# Patient Record
Sex: MALE | Race: WHITE | NOT HISPANIC OR LATINO | Employment: OTHER | URBAN - METROPOLITAN AREA
[De-identification: names, ages, dates, MRNs, and addresses within clinical notes are randomized per-mention and may not be internally consistent; named-entity substitution may affect disease eponyms.]

---

## 2017-03-13 ENCOUNTER — OFFICE VISIT - HEALTHEAST (OUTPATIENT)
Dept: FAMILY MEDICINE | Facility: CLINIC | Age: 72
End: 2017-03-13

## 2017-03-13 DIAGNOSIS — I63.439: ICD-10-CM

## 2017-03-13 DIAGNOSIS — E78.00 HYPERCHOLESTEREMIA: ICD-10-CM

## 2017-03-13 DIAGNOSIS — E78.00 PURE HYPERCHOLESTEROLEMIA: ICD-10-CM

## 2017-03-13 DIAGNOSIS — F48.2 PSEUDOBULBAR AFFECT: ICD-10-CM

## 2017-03-13 DIAGNOSIS — R47.1 DYSARTHRIA: ICD-10-CM

## 2017-03-13 LAB
CHOLEST SERPL-MCNC: 113 MG/DL
FASTING STATUS PATIENT QL REPORTED: YES
HDLC SERPL-MCNC: 32 MG/DL
LDLC SERPL CALC-MCNC: 64 MG/DL
TRIGL SERPL-MCNC: 85 MG/DL

## 2017-03-13 ASSESSMENT — MIFFLIN-ST. JEOR: SCORE: 1708.08

## 2017-03-14 ENCOUNTER — COMMUNICATION - HEALTHEAST (OUTPATIENT)
Dept: FAMILY MEDICINE | Facility: CLINIC | Age: 72
End: 2017-03-14

## 2017-09-27 ENCOUNTER — HOSPITAL ENCOUNTER (OUTPATIENT)
Dept: LAB | Age: 72
Setting detail: SPECIMEN
Discharge: HOME | End: 2017-09-27

## 2017-09-27 ENCOUNTER — OFFICE VISIT - HEALTHEAST (OUTPATIENT)
Dept: FAMILY MEDICINE | Facility: CLINIC | Age: 72
End: 2017-09-27

## 2017-09-27 DIAGNOSIS — E78.00 PURE HYPERCHOLESTEROLEMIA: ICD-10-CM

## 2017-09-27 DIAGNOSIS — R47.1 DYSARTHRIA: ICD-10-CM

## 2017-09-27 DIAGNOSIS — I63.9 CEREBRAL INFARCT (H): ICD-10-CM

## 2017-09-27 LAB
CHOLEST SERPL-MCNC: 107 MG/DL
FASTING STATUS PATIENT QL REPORTED: YES
HDLC SERPL-MCNC: 29 MG/DL
LDLC SERPL CALC-MCNC: 56 MG/DL
TRIGL SERPL-MCNC: 108 MG/DL

## 2017-09-27 ASSESSMENT — MIFFLIN-ST. JEOR: SCORE: 1760.7

## 2017-09-28 ENCOUNTER — COMMUNICATION - HEALTHEAST (OUTPATIENT)
Dept: FAMILY MEDICINE | Facility: CLINIC | Age: 72
End: 2017-09-28

## 2017-10-12 ENCOUNTER — COMMUNICATION - HEALTHEAST (OUTPATIENT)
Dept: FAMILY MEDICINE | Facility: CLINIC | Age: 72
End: 2017-10-12

## 2017-10-12 DIAGNOSIS — I63.439: ICD-10-CM

## 2017-10-12 DIAGNOSIS — F48.2 PSEUDOBULBAR AFFECT: ICD-10-CM

## 2017-10-12 DIAGNOSIS — E78.00 HYPERCHOLESTEREMIA: ICD-10-CM

## 2018-03-25 ENCOUNTER — RECORDS - HEALTHEAST (OUTPATIENT)
Dept: ADMINISTRATIVE | Facility: OTHER | Age: 73
End: 2018-03-25

## 2018-03-29 ENCOUNTER — HOSPITAL ENCOUNTER (OUTPATIENT)
Dept: LAB | Age: 73
Setting detail: SPECIMEN
Discharge: HOME | End: 2018-03-29

## 2018-03-29 ENCOUNTER — OFFICE VISIT - HEALTHEAST (OUTPATIENT)
Dept: FAMILY MEDICINE | Facility: CLINIC | Age: 73
End: 2018-03-29

## 2018-03-29 DIAGNOSIS — G47.33 OBSTRUCTIVE SLEEP APNEA: ICD-10-CM

## 2018-03-29 DIAGNOSIS — F48.2 PSEUDOBULBAR AFFECT: ICD-10-CM

## 2018-03-29 DIAGNOSIS — E78.00 PURE HYPERCHOLESTEROLEMIA: ICD-10-CM

## 2018-03-29 DIAGNOSIS — I63.439: ICD-10-CM

## 2018-03-29 LAB
ALBUMIN SERPL-MCNC: 3.8 G/DL (ref 3.5–5)
ALP SERPL-CCNC: 86 U/L (ref 45–120)
ALT SERPL W P-5'-P-CCNC: 54 U/L (ref 0–45)
ANION GAP SERPL CALCULATED.3IONS-SCNC: 9 MMOL/L (ref 5–18)
AST SERPL W P-5'-P-CCNC: 29 U/L (ref 0–40)
BILIRUB DIRECT SERPL-MCNC: 0.2 MG/DL
BILIRUB SERPL-MCNC: 0.7 MG/DL (ref 0–1)
BUN SERPL-MCNC: 14 MG/DL (ref 8–28)
CALCIUM SERPL-MCNC: 8.7 MG/DL (ref 8.5–10.5)
CHLORIDE BLD-SCNC: 105 MMOL/L (ref 98–107)
CHOLEST SERPL-MCNC: 105 MG/DL
CO2 SERPL-SCNC: 25 MMOL/L (ref 22–31)
CREAT SERPL-MCNC: 1.05 MG/DL (ref 0.7–1.3)
FASTING STATUS PATIENT QL REPORTED: YES
GFR SERPL CREATININE-BSD FRML MDRD: >60 ML/MIN/1.73M2
GLUCOSE BLD-MCNC: 127 MG/DL (ref 70–125)
HDLC SERPL-MCNC: 29 MG/DL
LDLC SERPL CALC-MCNC: 48 MG/DL
POTASSIUM BLD-SCNC: 4.9 MMOL/L (ref 3.5–5)
PROT SERPL-MCNC: 6.8 G/DL (ref 6–8)
SODIUM SERPL-SCNC: 139 MMOL/L (ref 136–145)
TRIGL SERPL-MCNC: 138 MG/DL

## 2018-03-30 ENCOUNTER — AMBULATORY - HEALTHEAST (OUTPATIENT)
Dept: FAMILY MEDICINE | Facility: CLINIC | Age: 73
End: 2018-03-30

## 2018-03-30 ENCOUNTER — COMMUNICATION - HEALTHEAST (OUTPATIENT)
Dept: FAMILY MEDICINE | Facility: CLINIC | Age: 73
End: 2018-03-30

## 2018-03-30 DIAGNOSIS — R73.9 HYPERGLYCEMIA: ICD-10-CM

## 2018-04-05 ENCOUNTER — COMMUNICATION - HEALTHEAST (OUTPATIENT)
Dept: FAMILY MEDICINE | Facility: CLINIC | Age: 73
End: 2018-04-05

## 2018-04-05 ENCOUNTER — AMBULATORY - HEALTHEAST (OUTPATIENT)
Dept: LAB | Facility: CLINIC | Age: 73
End: 2018-04-05

## 2018-04-05 DIAGNOSIS — I63.439: ICD-10-CM

## 2018-04-05 DIAGNOSIS — R73.9 HYPERGLYCEMIA: ICD-10-CM

## 2018-04-05 LAB
FASTING STATUS PATIENT QL REPORTED: YES
GLUCOSE BLD-MCNC: 122 MG/DL (ref 79–116)
HBA1C MFR BLD: 6.8 % (ref 3.5–6)

## 2018-04-09 ENCOUNTER — OFFICE VISIT - HEALTHEAST (OUTPATIENT)
Dept: FAMILY MEDICINE | Facility: CLINIC | Age: 73
End: 2018-04-09

## 2018-04-09 DIAGNOSIS — E11.9 NON-INSULIN DEPENDENT TYPE 2 DIABETES MELLITUS (H): ICD-10-CM

## 2018-04-09 LAB
CREAT UR-MCNC: 33.4 MG/DL
MICROALBUMIN UR-MCNC: <0.5 MG/DL (ref 0–1.99)
MICROALBUMIN/CREAT UR: NORMAL MG/G

## 2018-04-10 ENCOUNTER — COMMUNICATION - HEALTHEAST (OUTPATIENT)
Dept: FAMILY MEDICINE | Facility: CLINIC | Age: 73
End: 2018-04-10

## 2018-04-19 ENCOUNTER — COMMUNICATION - HEALTHEAST (OUTPATIENT)
Dept: FAMILY MEDICINE | Facility: CLINIC | Age: 73
End: 2018-04-19

## 2018-05-01 ENCOUNTER — RECORDS - HEALTHEAST (OUTPATIENT)
Dept: ADMINISTRATIVE | Facility: OTHER | Age: 73
End: 2018-05-01

## 2018-05-02 ENCOUNTER — RECORDS - HEALTHEAST (OUTPATIENT)
Dept: ADMINISTRATIVE | Facility: OTHER | Age: 73
End: 2018-05-02

## 2018-05-08 ENCOUNTER — AMBULATORY - HEALTHEAST (OUTPATIENT)
Dept: EDUCATION SERVICES | Facility: CLINIC | Age: 73
End: 2018-05-08

## 2018-05-08 DIAGNOSIS — E11.9 TYPE 2 DIABETES MELLITUS WITHOUT COMPLICATION, WITHOUT LONG-TERM CURRENT USE OF INSULIN (H): ICD-10-CM

## 2018-05-22 ENCOUNTER — OFFICE VISIT - HEALTHEAST (OUTPATIENT)
Dept: EDUCATION SERVICES | Facility: CLINIC | Age: 73
End: 2018-05-22

## 2018-05-22 DIAGNOSIS — E11.9 TYPE 2 DIABETES MELLITUS WITHOUT COMPLICATION, WITHOUT LONG-TERM CURRENT USE OF INSULIN (H): ICD-10-CM

## 2018-07-15 ENCOUNTER — COMMUNICATION - HEALTHEAST (OUTPATIENT)
Dept: FAMILY MEDICINE | Facility: CLINIC | Age: 73
End: 2018-07-15

## 2018-07-15 DIAGNOSIS — I63.439: ICD-10-CM

## 2018-07-15 DIAGNOSIS — F48.2 PSEUDOBULBAR AFFECT: ICD-10-CM

## 2018-07-15 DIAGNOSIS — E78.00 HYPERCHOLESTEREMIA: ICD-10-CM

## 2018-07-23 ENCOUNTER — OFFICE VISIT - HEALTHEAST (OUTPATIENT)
Dept: FAMILY MEDICINE | Facility: CLINIC | Age: 73
End: 2018-07-23

## 2018-07-23 DIAGNOSIS — F48.2 PSEUDOBULBAR AFFECT: ICD-10-CM

## 2018-07-23 DIAGNOSIS — E78.00 PURE HYPERCHOLESTEROLEMIA: ICD-10-CM

## 2018-07-23 DIAGNOSIS — E78.00 HYPERCHOLESTEREMIA: ICD-10-CM

## 2018-07-23 DIAGNOSIS — I63.439: ICD-10-CM

## 2018-07-23 DIAGNOSIS — E11.9 NON-INSULIN DEPENDENT TYPE 2 DIABETES MELLITUS (H): ICD-10-CM

## 2018-07-23 LAB
ALBUMIN SERPL-MCNC: 4.1 G/DL (ref 3.5–5)
ALP SERPL-CCNC: 78 U/L (ref 45–120)
ALT SERPL W P-5'-P-CCNC: 36 U/L (ref 0–45)
ANION GAP SERPL CALCULATED.3IONS-SCNC: 9 MMOL/L (ref 5–18)
AST SERPL W P-5'-P-CCNC: 26 U/L (ref 0–40)
BILIRUB DIRECT SERPL-MCNC: 0.2 MG/DL
BILIRUB SERPL-MCNC: 0.5 MG/DL (ref 0–1)
BUN SERPL-MCNC: 19 MG/DL (ref 8–28)
CALCIUM SERPL-MCNC: 8.8 MG/DL (ref 8.5–10.5)
CHLORIDE BLD-SCNC: 110 MMOL/L (ref 98–107)
CHOLEST SERPL-MCNC: 96 MG/DL
CO2 SERPL-SCNC: 24 MMOL/L (ref 22–31)
CREAT SERPL-MCNC: 1.01 MG/DL (ref 0.7–1.3)
FASTING STATUS PATIENT QL REPORTED: YES
GFR SERPL CREATININE-BSD FRML MDRD: >60 ML/MIN/1.73M2
GLUCOSE BLD-MCNC: 116 MG/DL (ref 70–125)
HBA1C MFR BLD: 6.2 % (ref 3.5–6)
HDLC SERPL-MCNC: 29 MG/DL
LDLC SERPL CALC-MCNC: 48 MG/DL
POTASSIUM BLD-SCNC: 4.5 MMOL/L (ref 3.5–5)
PROT SERPL-MCNC: 6.8 G/DL (ref 6–8)
SODIUM SERPL-SCNC: 143 MMOL/L (ref 136–145)
TRIGL SERPL-MCNC: 95 MG/DL

## 2018-08-28 ENCOUNTER — OFFICE VISIT - HEALTHEAST (OUTPATIENT)
Dept: EDUCATION SERVICES | Facility: CLINIC | Age: 73
End: 2018-08-28

## 2018-08-28 DIAGNOSIS — E11.9 TYPE 2 DIABETES MELLITUS WITHOUT COMPLICATION, WITHOUT LONG-TERM CURRENT USE OF INSULIN (H): ICD-10-CM

## 2018-09-11 ENCOUNTER — COMMUNICATION - HEALTHEAST (OUTPATIENT)
Dept: FAMILY MEDICINE | Facility: CLINIC | Age: 73
End: 2018-09-11

## 2018-09-12 ENCOUNTER — RECORDS - HEALTHEAST (OUTPATIENT)
Dept: ADMINISTRATIVE | Facility: OTHER | Age: 73
End: 2018-09-12

## 2018-10-28 ENCOUNTER — COMMUNICATION - HEALTHEAST (OUTPATIENT)
Dept: FAMILY MEDICINE | Facility: CLINIC | Age: 73
End: 2018-10-28

## 2018-10-28 DIAGNOSIS — E11.9 TYPE 2 DIABETES MELLITUS WITHOUT COMPLICATION, WITHOUT LONG-TERM CURRENT USE OF INSULIN (H): ICD-10-CM

## 2018-11-28 ENCOUNTER — RECORDS - HEALTHEAST (OUTPATIENT)
Dept: ADMINISTRATIVE | Facility: OTHER | Age: 73
End: 2018-11-28

## 2018-11-28 LAB — RETINOPATHY: NEGATIVE

## 2018-11-29 ENCOUNTER — OFFICE VISIT - HEALTHEAST (OUTPATIENT)
Dept: FAMILY MEDICINE | Facility: CLINIC | Age: 73
End: 2018-11-29

## 2018-11-29 DIAGNOSIS — E78.00 HYPERCHOLESTEREMIA: ICD-10-CM

## 2018-11-29 DIAGNOSIS — I63.439: ICD-10-CM

## 2018-11-29 DIAGNOSIS — E11.9 TYPE 2 DIABETES MELLITUS WITHOUT COMPLICATION, WITHOUT LONG-TERM CURRENT USE OF INSULIN (H): ICD-10-CM

## 2018-11-29 DIAGNOSIS — R47.1 DYSARTHRIA: ICD-10-CM

## 2018-11-29 DIAGNOSIS — E11.9 NON-INSULIN DEPENDENT TYPE 2 DIABETES MELLITUS (H): ICD-10-CM

## 2018-11-29 DIAGNOSIS — F48.2 PSEUDOBULBAR AFFECT: ICD-10-CM

## 2018-11-29 DIAGNOSIS — E78.00 PURE HYPERCHOLESTEROLEMIA: ICD-10-CM

## 2018-11-29 DIAGNOSIS — I63.431 CEREBRAL INFARCTION DUE TO EMBOLISM OF RIGHT POSTERIOR CEREBRAL ARTERY (H): ICD-10-CM

## 2018-11-29 LAB
ALBUMIN SERPL-MCNC: 4.1 G/DL (ref 3.5–5)
ALP SERPL-CCNC: 80 U/L (ref 45–120)
ALT SERPL W P-5'-P-CCNC: 38 U/L (ref 0–45)
ANION GAP SERPL CALCULATED.3IONS-SCNC: 9 MMOL/L (ref 5–18)
AST SERPL W P-5'-P-CCNC: 28 U/L (ref 0–40)
BILIRUB DIRECT SERPL-MCNC: 0.3 MG/DL
BILIRUB SERPL-MCNC: 0.9 MG/DL (ref 0–1)
BUN SERPL-MCNC: 15 MG/DL (ref 8–28)
CALCIUM SERPL-MCNC: 9.5 MG/DL (ref 8.5–10.5)
CHLORIDE BLD-SCNC: 108 MMOL/L (ref 98–107)
CHOLEST SERPL-MCNC: 100 MG/DL
CO2 SERPL-SCNC: 26 MMOL/L (ref 22–31)
CREAT SERPL-MCNC: 1.11 MG/DL (ref 0.7–1.3)
FASTING STATUS PATIENT QL REPORTED: YES
GFR SERPL CREATININE-BSD FRML MDRD: >60 ML/MIN/1.73M2
GLUCOSE BLD-MCNC: 116 MG/DL (ref 70–125)
HDLC SERPL-MCNC: 34 MG/DL
LDLC SERPL CALC-MCNC: 46 MG/DL
POTASSIUM BLD-SCNC: 4.6 MMOL/L (ref 3.5–5)
PROT SERPL-MCNC: 7.1 G/DL (ref 6–8)
SODIUM SERPL-SCNC: 143 MMOL/L (ref 136–145)
TRIGL SERPL-MCNC: 102 MG/DL

## 2018-11-30 ENCOUNTER — COMMUNICATION - HEALTHEAST (OUTPATIENT)
Dept: FAMILY MEDICINE | Facility: CLINIC | Age: 73
End: 2018-11-30

## 2018-11-30 LAB — HBA1C MFR BLD: 6.5 % (ref 4.2–6.1)

## 2019-03-07 ENCOUNTER — RECORDS - HEALTHEAST (OUTPATIENT)
Dept: ADMINISTRATIVE | Facility: OTHER | Age: 74
End: 2019-03-07

## 2019-03-07 ENCOUNTER — COMMUNICATION - HEALTHEAST (OUTPATIENT)
Dept: FAMILY MEDICINE | Facility: CLINIC | Age: 74
End: 2019-03-07

## 2019-04-04 ENCOUNTER — OFFICE VISIT - HEALTHEAST (OUTPATIENT)
Dept: FAMILY MEDICINE | Facility: CLINIC | Age: 74
End: 2019-04-04

## 2019-04-04 DIAGNOSIS — I63.431 CEREBRAL INFARCTION DUE TO EMBOLISM OF RIGHT POSTERIOR CEREBRAL ARTERY (H): ICD-10-CM

## 2019-04-04 DIAGNOSIS — E78.00 HYPERCHOLESTEREMIA: ICD-10-CM

## 2019-04-04 DIAGNOSIS — I63.439: ICD-10-CM

## 2019-04-04 DIAGNOSIS — E11.9 NON-INSULIN DEPENDENT TYPE 2 DIABETES MELLITUS (H): ICD-10-CM

## 2019-04-04 DIAGNOSIS — E11.9 TYPE 2 DIABETES MELLITUS WITHOUT COMPLICATION, WITHOUT LONG-TERM CURRENT USE OF INSULIN (H): ICD-10-CM

## 2019-04-04 DIAGNOSIS — F48.2 PSEUDOBULBAR AFFECT: ICD-10-CM

## 2019-04-04 DIAGNOSIS — E78.00 PURE HYPERCHOLESTEROLEMIA: ICD-10-CM

## 2019-04-04 DIAGNOSIS — R47.1 DYSARTHRIA: ICD-10-CM

## 2019-04-04 LAB
ALBUMIN SERPL-MCNC: 4.2 G/DL (ref 3.5–5)
ALP SERPL-CCNC: 71 U/L (ref 45–120)
ALT SERPL W P-5'-P-CCNC: 34 U/L (ref 0–45)
ANION GAP SERPL CALCULATED.3IONS-SCNC: 13 MMOL/L (ref 5–18)
AST SERPL W P-5'-P-CCNC: 26 U/L (ref 0–40)
BILIRUB DIRECT SERPL-MCNC: 0.3 MG/DL
BILIRUB SERPL-MCNC: 0.7 MG/DL (ref 0–1)
BUN SERPL-MCNC: 15 MG/DL (ref 8–28)
CALCIUM SERPL-MCNC: 9.8 MG/DL (ref 8.5–10.5)
CHLORIDE BLD-SCNC: 110 MMOL/L (ref 98–107)
CHOLEST SERPL-MCNC: 104 MG/DL
CO2 SERPL-SCNC: 23 MMOL/L (ref 22–31)
CREAT SERPL-MCNC: 1.17 MG/DL (ref 0.7–1.3)
FASTING STATUS PATIENT QL REPORTED: YES
GFR SERPL CREATININE-BSD FRML MDRD: >60 ML/MIN/1.73M2
GLUCOSE BLD-MCNC: 122 MG/DL (ref 70–125)
HBA1C MFR BLD: 6.5 % (ref 3.5–6)
HDLC SERPL-MCNC: 36 MG/DL
LDLC SERPL CALC-MCNC: 47 MG/DL
POTASSIUM BLD-SCNC: 4.8 MMOL/L (ref 3.5–5)
PROT SERPL-MCNC: 6.9 G/DL (ref 6–8)
SODIUM SERPL-SCNC: 146 MMOL/L (ref 136–145)
TRIGL SERPL-MCNC: 107 MG/DL

## 2019-04-04 ASSESSMENT — MIFFLIN-ST. JEOR: SCORE: 1748.9

## 2019-04-05 ENCOUNTER — COMMUNICATION - HEALTHEAST (OUTPATIENT)
Dept: FAMILY MEDICINE | Facility: CLINIC | Age: 74
End: 2019-04-05

## 2019-08-14 ENCOUNTER — RECORDS - HEALTHEAST (OUTPATIENT)
Dept: ADMINISTRATIVE | Facility: OTHER | Age: 74
End: 2019-08-14

## 2019-08-14 ENCOUNTER — COMMUNICATION - HEALTHEAST (OUTPATIENT)
Dept: FAMILY MEDICINE | Facility: CLINIC | Age: 74
End: 2019-08-14

## 2019-08-15 ENCOUNTER — OFFICE VISIT - HEALTHEAST (OUTPATIENT)
Dept: FAMILY MEDICINE | Facility: CLINIC | Age: 74
End: 2019-08-15

## 2019-08-15 DIAGNOSIS — R47.1 DYSARTHRIA: ICD-10-CM

## 2019-08-15 DIAGNOSIS — I63.431 CEREBRAL INFARCTION DUE TO EMBOLISM OF RIGHT POSTERIOR CEREBRAL ARTERY (H): ICD-10-CM

## 2019-08-15 DIAGNOSIS — E78.00 PURE HYPERCHOLESTEROLEMIA: ICD-10-CM

## 2019-08-15 DIAGNOSIS — E11.9 NON-INSULIN DEPENDENT TYPE 2 DIABETES MELLITUS (H): ICD-10-CM

## 2019-08-15 LAB
ALBUMIN SERPL-MCNC: 4.1 G/DL (ref 3.5–5)
ALP SERPL-CCNC: 82 U/L (ref 45–120)
ALT SERPL W P-5'-P-CCNC: 29 U/L (ref 0–45)
ANION GAP SERPL CALCULATED.3IONS-SCNC: 8 MMOL/L (ref 5–18)
AST SERPL W P-5'-P-CCNC: 24 U/L (ref 0–40)
BILIRUB DIRECT SERPL-MCNC: 0.3 MG/DL
BILIRUB SERPL-MCNC: 0.7 MG/DL (ref 0–1)
BUN SERPL-MCNC: 16 MG/DL (ref 8–28)
CALCIUM SERPL-MCNC: 9.4 MG/DL (ref 8.5–10.5)
CHLORIDE BLD-SCNC: 107 MMOL/L (ref 98–107)
CHOLEST SERPL-MCNC: 110 MG/DL
CO2 SERPL-SCNC: 26 MMOL/L (ref 22–31)
CREAT SERPL-MCNC: 1.06 MG/DL (ref 0.7–1.3)
CREAT UR-MCNC: 283.6 MG/DL
FASTING STATUS PATIENT QL REPORTED: ABNORMAL
GFR SERPL CREATININE-BSD FRML MDRD: >60 ML/MIN/1.73M2
GLUCOSE BLD-MCNC: 111 MG/DL (ref 70–125)
HDLC SERPL-MCNC: 30 MG/DL
LDLC SERPL CALC-MCNC: 55 MG/DL
MICROALBUMIN UR-MCNC: 1.24 MG/DL (ref 0–1.99)
MICROALBUMIN/CREAT UR: 4.4 MG/G
POTASSIUM BLD-SCNC: 4.6 MMOL/L (ref 3.5–5)
PROT SERPL-MCNC: 6.9 G/DL (ref 6–8)
SODIUM SERPL-SCNC: 141 MMOL/L (ref 136–145)
TRIGL SERPL-MCNC: 126 MG/DL

## 2019-08-15 ASSESSMENT — MIFFLIN-ST. JEOR: SCORE: 1686.54

## 2019-08-16 ENCOUNTER — COMMUNICATION - HEALTHEAST (OUTPATIENT)
Dept: FAMILY MEDICINE | Facility: CLINIC | Age: 74
End: 2019-08-16

## 2019-09-23 ENCOUNTER — COMMUNICATION - HEALTHEAST (OUTPATIENT)
Dept: TELEHEALTH | Facility: CLINIC | Age: 74
End: 2019-09-23

## 2019-09-23 ENCOUNTER — OFFICE VISIT - HEALTHEAST (OUTPATIENT)
Dept: FAMILY MEDICINE | Facility: CLINIC | Age: 74
End: 2019-09-23

## 2019-09-23 DIAGNOSIS — R10.9 RIGHT FLANK PAIN: ICD-10-CM

## 2019-09-23 LAB
ALBUMIN SERPL-MCNC: 4 G/DL (ref 3.5–5)
ALBUMIN UR-MCNC: NEGATIVE MG/DL
ALP SERPL-CCNC: 71 U/L (ref 45–120)
ALT SERPL W P-5'-P-CCNC: 33 U/L (ref 0–45)
ANION GAP SERPL CALCULATED.3IONS-SCNC: 9 MMOL/L (ref 5–18)
APPEARANCE UR: CLEAR
AST SERPL W P-5'-P-CCNC: 23 U/L (ref 0–40)
BILIRUB DIRECT SERPL-MCNC: 0.2 MG/DL
BILIRUB SERPL-MCNC: 0.5 MG/DL (ref 0–1)
BILIRUB UR QL STRIP: NEGATIVE
BUN SERPL-MCNC: 14 MG/DL (ref 8–28)
CALCIUM SERPL-MCNC: 9.4 MG/DL (ref 8.5–10.5)
CHLORIDE BLD-SCNC: 107 MMOL/L (ref 98–107)
CO2 SERPL-SCNC: 26 MMOL/L (ref 22–31)
COLOR UR AUTO: YELLOW
CREAT SERPL-MCNC: 0.91 MG/DL (ref 0.7–1.3)
ERYTHROCYTE [DISTWIDTH] IN BLOOD BY AUTOMATED COUNT: 12.2 % (ref 11–14.5)
GFR SERPL CREATININE-BSD FRML MDRD: >60 ML/MIN/1.73M2
GLUCOSE BLD-MCNC: 102 MG/DL (ref 70–125)
GLUCOSE UR STRIP-MCNC: NEGATIVE MG/DL
HCT VFR BLD AUTO: 40.5 % (ref 40–54)
HGB BLD-MCNC: 13.6 G/DL (ref 14–18)
HGB UR QL STRIP: NEGATIVE
KETONES UR STRIP-MCNC: NEGATIVE MG/DL
LEUKOCYTE ESTERASE UR QL STRIP: NEGATIVE
MCH RBC QN AUTO: 28.7 PG (ref 27–34)
MCHC RBC AUTO-ENTMCNC: 33.7 G/DL (ref 32–36)
MCV RBC AUTO: 85 FL (ref 80–100)
NITRATE UR QL: NEGATIVE
PH UR STRIP: 7 [PH] (ref 5–8)
PLATELET # BLD AUTO: 215 THOU/UL (ref 140–440)
PMV BLD AUTO: 7.6 FL (ref 7–10)
POTASSIUM BLD-SCNC: 4.7 MMOL/L (ref 3.5–5)
PROT SERPL-MCNC: 6.9 G/DL (ref 6–8)
RBC # BLD AUTO: 4.74 MILL/UL (ref 4.4–6.2)
SODIUM SERPL-SCNC: 142 MMOL/L (ref 136–145)
SP GR UR STRIP: 1.01 (ref 1–1.03)
UROBILINOGEN UR STRIP-ACNC: NORMAL
WBC: 6 THOU/UL (ref 4–11)

## 2019-10-03 ENCOUNTER — OFFICE VISIT - HEALTHEAST (OUTPATIENT)
Dept: FAMILY MEDICINE | Facility: CLINIC | Age: 74
End: 2019-10-03

## 2019-10-03 DIAGNOSIS — R47.1 DYSARTHRIA: ICD-10-CM

## 2019-10-03 DIAGNOSIS — E11.9 NON-INSULIN DEPENDENT TYPE 2 DIABETES MELLITUS (H): ICD-10-CM

## 2019-10-03 DIAGNOSIS — E78.00 HYPERCHOLESTEREMIA: ICD-10-CM

## 2019-10-03 DIAGNOSIS — E78.00 PURE HYPERCHOLESTEROLEMIA: ICD-10-CM

## 2019-10-03 DIAGNOSIS — G47.33 OBSTRUCTIVE SLEEP APNEA: ICD-10-CM

## 2019-10-03 DIAGNOSIS — Z00.00 MEDICARE ANNUAL WELLNESS VISIT, SUBSEQUENT: ICD-10-CM

## 2019-10-03 DIAGNOSIS — I63.431 CEREBRAL INFARCTION DUE TO EMBOLISM OF RIGHT POSTERIOR CEREBRAL ARTERY (H): ICD-10-CM

## 2019-10-03 DIAGNOSIS — I63.439: ICD-10-CM

## 2019-10-03 DIAGNOSIS — F48.2 PSEUDOBULBAR AFFECT: ICD-10-CM

## 2019-10-03 LAB — HBA1C MFR BLD: 6.1 % (ref 3.5–6)

## 2019-10-03 ASSESSMENT — MIFFLIN-ST. JEOR: SCORE: 1677.47

## 2019-10-17 ENCOUNTER — COMMUNICATION - HEALTHEAST (OUTPATIENT)
Dept: FAMILY MEDICINE | Facility: CLINIC | Age: 74
End: 2019-10-17

## 2019-10-22 ENCOUNTER — AMBULATORY - HEALTHEAST (OUTPATIENT)
Dept: OTHER | Facility: CLINIC | Age: 74
End: 2019-10-22

## 2019-10-22 ENCOUNTER — DOCUMENTATION ONLY (OUTPATIENT)
Dept: OTHER | Facility: CLINIC | Age: 74
End: 2019-10-22

## 2019-11-14 ENCOUNTER — RECORDS - HEALTHEAST (OUTPATIENT)
Dept: HEALTH INFORMATION MANAGEMENT | Facility: CLINIC | Age: 74
End: 2019-11-14

## 2020-02-17 ENCOUNTER — RECORDS - HEALTHEAST (OUTPATIENT)
Dept: ADMINISTRATIVE | Facility: OTHER | Age: 75
End: 2020-02-17

## 2020-02-17 ENCOUNTER — COMMUNICATION - HEALTHEAST (OUTPATIENT)
Dept: FAMILY MEDICINE | Facility: CLINIC | Age: 75
End: 2020-02-17

## 2020-04-29 ENCOUNTER — OFFICE VISIT - HEALTHEAST (OUTPATIENT)
Dept: FAMILY MEDICINE | Facility: CLINIC | Age: 75
End: 2020-04-29

## 2020-04-29 DIAGNOSIS — I63.431 CEREBRAL INFARCTION DUE TO EMBOLISM OF RIGHT POSTERIOR CEREBRAL ARTERY (H): ICD-10-CM

## 2020-04-29 DIAGNOSIS — F48.2 PSEUDOBULBAR AFFECT: ICD-10-CM

## 2020-04-29 DIAGNOSIS — R47.1 DYSARTHRIA: ICD-10-CM

## 2020-04-29 DIAGNOSIS — E11.9 NON-INSULIN DEPENDENT TYPE 2 DIABETES MELLITUS (H): ICD-10-CM

## 2020-04-29 DIAGNOSIS — E78.00 PURE HYPERCHOLESTEROLEMIA: ICD-10-CM

## 2020-05-11 ENCOUNTER — COMMUNICATION - HEALTHEAST (OUTPATIENT)
Dept: FAMILY MEDICINE | Facility: CLINIC | Age: 75
End: 2020-05-11

## 2020-05-11 DIAGNOSIS — I63.439: ICD-10-CM

## 2020-05-11 DIAGNOSIS — E78.00 HYPERCHOLESTEREMIA: ICD-10-CM

## 2020-05-21 ASSESSMENT — MIFFLIN-ST. JEOR: SCORE: 1668.4

## 2020-05-22 ENCOUNTER — OFFICE VISIT - HEALTHEAST (OUTPATIENT)
Dept: SLEEP MEDICINE | Facility: CLINIC | Age: 75
End: 2020-05-22

## 2020-05-22 DIAGNOSIS — I63.431 CEREBRAL INFARCTION DUE TO EMBOLISM OF RIGHT POSTERIOR CEREBRAL ARTERY (H): ICD-10-CM

## 2020-05-22 DIAGNOSIS — E11.9 NON-INSULIN DEPENDENT TYPE 2 DIABETES MELLITUS (H): ICD-10-CM

## 2020-05-22 DIAGNOSIS — G47.33 OSA (OBSTRUCTIVE SLEEP APNEA): ICD-10-CM

## 2020-06-30 ENCOUNTER — COMMUNICATION - HEALTHEAST (OUTPATIENT)
Dept: OTHER | Facility: CLINIC | Age: 75
End: 2020-06-30

## 2020-07-21 ENCOUNTER — OFFICE VISIT - HEALTHEAST (OUTPATIENT)
Dept: FAMILY MEDICINE | Facility: CLINIC | Age: 75
End: 2020-07-21

## 2020-07-21 DIAGNOSIS — E11.9 NON-INSULIN DEPENDENT TYPE 2 DIABETES MELLITUS (H): ICD-10-CM

## 2020-07-21 DIAGNOSIS — I63.431 CEREBRAL INFARCTION DUE TO EMBOLISM OF RIGHT POSTERIOR CEREBRAL ARTERY (H): ICD-10-CM

## 2020-07-21 LAB
ANION GAP SERPL CALCULATED.3IONS-SCNC: 10 MMOL/L (ref 5–18)
BUN SERPL-MCNC: 13 MG/DL (ref 8–28)
CALCIUM SERPL-MCNC: 9.6 MG/DL (ref 8.5–10.5)
CHLORIDE BLD-SCNC: 106 MMOL/L (ref 98–107)
CO2 SERPL-SCNC: 25 MMOL/L (ref 22–31)
CREAT SERPL-MCNC: 1.1 MG/DL (ref 0.7–1.3)
ERYTHROCYTE [DISTWIDTH] IN BLOOD BY AUTOMATED COUNT: 12.7 % (ref 11–14.5)
GFR SERPL CREATININE-BSD FRML MDRD: >60 ML/MIN/1.73M2
GLUCOSE BLD-MCNC: 108 MG/DL (ref 70–125)
HBA1C MFR BLD: 6 % (ref 3.5–6)
HCT VFR BLD AUTO: 41.7 % (ref 40–54)
HGB BLD-MCNC: 13.8 G/DL (ref 14–18)
MCH RBC QN AUTO: 27.8 PG (ref 27–34)
MCHC RBC AUTO-ENTMCNC: 33.1 G/DL (ref 32–36)
MCV RBC AUTO: 84 FL (ref 80–100)
PLATELET # BLD AUTO: 211 THOU/UL (ref 140–440)
PMV BLD AUTO: 7.7 FL (ref 7–10)
POTASSIUM BLD-SCNC: 5.1 MMOL/L (ref 3.5–5)
RBC # BLD AUTO: 4.96 MILL/UL (ref 4.4–6.2)
SODIUM SERPL-SCNC: 141 MMOL/L (ref 136–145)
WBC: 4.9 THOU/UL (ref 4–11)

## 2020-07-22 ENCOUNTER — COMMUNICATION - HEALTHEAST (OUTPATIENT)
Dept: FAMILY MEDICINE | Facility: CLINIC | Age: 75
End: 2020-07-22

## 2020-07-27 ENCOUNTER — RECORDS - HEALTHEAST (OUTPATIENT)
Dept: ADMINISTRATIVE | Facility: OTHER | Age: 75
End: 2020-07-27

## 2020-07-27 LAB — RETINOPATHY: NEGATIVE

## 2020-07-29 ENCOUNTER — RECORDS - HEALTHEAST (OUTPATIENT)
Dept: HEALTH INFORMATION MANAGEMENT | Facility: CLINIC | Age: 75
End: 2020-07-29

## 2020-08-10 ENCOUNTER — RECORDS - HEALTHEAST (OUTPATIENT)
Dept: ADMINISTRATIVE | Facility: OTHER | Age: 75
End: 2020-08-10

## 2020-08-13 NOTE — PROGRESS NOTES
"Kevin Webb is a 74 year old male who is being evaluated via a billable telephone visit.      The patient has been notified of following:     \"This telephone visit will be conducted via a call between you and your physician/provider. We have found that certain health care needs can be provided without the need for a physical exam.  This service lets us provide the care you need with a short phone conversation.  If a prescription is necessary we can send it directly to your pharmacy.  If lab work is needed we can place an order for that and you can then stop by our lab to have the test done at a later time.    Telephone visits are billed at different rates depending on your insurance coverage. During this emergency period, for some insurers they may be billed the same as an in-person visit.  Please reach out to your insurance provider with any questions.    If during the course of the call the physician/provider feels a telephone visit is not appropriate, you will not be charged for this service.\"    Patient has given verbal consent for Telephone visit?  Yes    What phone number would you like to be contacted at? [phone]     How would you like to obtain your AVS? Mail a copy    Phone call duration: 6 minutes     Sanjuanita Thompson CMA on 8/13/2020 at 9:12 AM    The patient returns to clinic to review his compliance download data on his new CPAP machine.  However I was not able to see the download data from [employer] anywhere.  I therefore will not charge the patient for this visit and instruct my staff to acquire a download data sent to me before the next scheduled visit.    No charge for this visit.    "

## 2020-08-14 ENCOUNTER — TELEPHONE (OUTPATIENT)
Dept: SLEEP MEDICINE | Facility: CLINIC | Age: 75
End: 2020-08-14

## 2020-08-14 ENCOUNTER — VIRTUAL VISIT (OUTPATIENT)
Dept: SLEEP MEDICINE | Facility: CLINIC | Age: 75
End: 2020-08-14
Payer: MEDICARE

## 2020-08-14 DIAGNOSIS — G47.33 OBSTRUCTIVE SLEEP APNEA: Primary | ICD-10-CM

## 2020-08-14 NOTE — TELEPHONE ENCOUNTER
Please call the patient to schedule for a follow up visit after we have obtained the download data for me to review. Thank you.

## 2020-08-17 RX ORDER — ASPIRIN 81 MG/1
81 TABLET ORAL
COMMUNITY

## 2020-08-17 RX ORDER — BLOOD-GLUCOSE METER
1 KIT MISCELLANEOUS
COMMUNITY
Start: 2018-11-29

## 2020-08-17 RX ORDER — ATORVASTATIN CALCIUM 40 MG/1
TABLET, FILM COATED ORAL
COMMUNITY
Start: 2020-07-30 | End: 2021-10-18

## 2020-08-17 RX ORDER — MAGNESIUM 200 MG
1000 TABLET ORAL
COMMUNITY
End: 2021-10-18

## 2020-08-17 RX ORDER — CLOPIDOGREL BISULFATE 75 MG/1
TABLET ORAL
COMMUNITY
Start: 2020-07-30 | End: 2021-10-18

## 2020-08-17 RX ORDER — BLOOD SUGAR DIAGNOSTIC
1 STRIP MISCELLANEOUS
COMMUNITY
Start: 2019-04-04 | End: 2023-09-29

## 2020-08-17 NOTE — PROGRESS NOTES
"Kevin Webb is a 74 year old male who is being evaluated via a billable telephone visit.      The patient has been notified of following:     \"This telephone visit will be conducted via a call between you and your physician/provider. We have found that certain health care needs can be provided without the need for a physical exam.  This service lets us provide the care you need with a short phone conversation.  If a prescription is necessary we can send it directly to your pharmacy.  If lab work is needed we can place an order for that and you can then stop by our lab to have the test done at a later time.    Telephone visits are billed at different rates depending on your insurance coverage. During this emergency period, for some insurers they may be billed the same as an in-person visit.  Please reach out to your insurance provider with any questions.    If during the course of the call the physician/provider feels a telephone visit is not appropriate, you will not be charged for this service.\"    Patient has given verbal consent for Telephone visit?  Yes    What phone number would you like to be contacted at? [phone]    How would you like to obtain your AVS? Mail a copy    Attempted to call at 1PM was unable to leave message.    2nd attempt to call  At 1:10PM was unable to leave message.    "

## 2020-08-19 ENCOUNTER — VIRTUAL VISIT (OUTPATIENT)
Dept: SLEEP MEDICINE | Facility: CLINIC | Age: 75
End: 2020-08-19
Payer: MEDICARE

## 2020-08-19 DIAGNOSIS — Z53.9 NO SHOW: Primary | ICD-10-CM

## 2020-08-31 NOTE — PROGRESS NOTES
"Kvein Webb is a 74 year old male who is being evaluated via a billable telephone visit.      The patient has been notified of following:     \"This telephone visit will be conducted via a call between you and your physician/provider. We have found that certain health care needs can be provided without the need for a physical exam.  This service lets us provide the care you need with a short phone conversation.  If a prescription is necessary we can send it directly to your pharmacy.  If lab work is needed we can place an order for that and you can then stop by our lab to have the test done at a later time.    Telephone visits are billed at different rates depending on your insurance coverage. During this emergency period, for some insurers they may be billed the same as an in-person visit.  Please reach out to your insurance provider with any questions.    If during the course of the call the physician/provider feels a telephone visit is not appropriate, you will not be charged for this service.\"    Patient has given verbal consent for Telephone visit?  Yes    What phone number would you like to be contacted at? [phone]    How would you like to obtain your AVS? Mail a copy      Thank you for the opportunity to participate in the care of Kevin Webb.     He is a 74 year old y/o male patient who comes to the sleep medicine clinic for follow up.  This is the patient's first follow-up visit since starting his new CPAP machine.  He states that he is doing well on the new CPAP machine and likes the current pressure settings the way it is.  He has no complaints.       Assessment and Plan:  In summary Kevin Webb is a 74 year old year old male who is here for follow-up.  1. Obstructive sleep apnea  I congratulated the patient on his excellent CPAP usage.  I will keep him on same pressure settings for now.  I welcomed him to follow-up with me annually.    Compliance Download data for 30 days:  Pressure " setting: CPAP 10 CWP  Residual AHI: 3.1 events per hour  Leak: Minimal  Compliance: 100%  Mask Tolerance: Good  Skin irritation: None  DME: [facility]    Sleep-Wake Cycle:    The patient likes to initiate sleep at around 9:30 PM with a sleep latency of 3 minutes. The patient has 1 nocturnal awakenings. Final wake up time is around 7:30 AM.  The patient also take a nap around 2 PM.    Past Medical History:   Diagnosis Date     Dyslipidemia      CB (obstructive sleep apnea)        Past Surgical History:   Procedure Laterality Date     KNEE SURGERY Bilateral        Social History     Socioeconomic History     Marital status:      Spouse name: Not on file     Number of children: Not on file     Years of education: Not on file     Highest education level: Not on file   Occupational History     Not on file   Social Needs     Financial resource strain: Not on file     Food insecurity     Worry: Not on file     Inability: Not on file     Transportation needs     Medical: Not on file     Non-medical: Not on file   Tobacco Use     Smoking status: Former Smoker     Smokeless tobacco: Never Used   Substance and Sexual Activity     Alcohol use: Not on file     Drug use: Not on file     Sexual activity: Not on file   Lifestyle     Physical activity     Days per week: Not on file     Minutes per session: Not on file     Stress: Not on file   Relationships     Social connections     Talks on phone: Not on file     Gets together: Not on file     Attends Zoroastrianism service: Not on file     Active member of club or organization: Not on file     Attends meetings of clubs or organizations: Not on file     Relationship status: Not on file     Intimate partner violence     Fear of current or ex partner: Not on file     Emotionally abused: Not on file     Physically abused: Not on file     Forced sexual activity: Not on file   Other Topics Concern     Not on file   Social History Narrative     Not on file       Current  Outpatient Medications   Medication Sig Dispense Refill     aspirin 81 MG EC tablet Take 81 mg by mouth       atorvastatin (LIPITOR) 40 MG tablet        B-D ULTRA-FINE 33 LANCETS MISC 1 strip       blood glucose (ONETOUCH ULTRA) test strip 1 each       clopidogrel (PLAVIX) 75 MG tablet        cyanocobalamin (VITAMIN B-12) 1000 MCG SUBL sublingual tablet Place 1,000 mcg under the tongue       sertraline (ZOLOFT) 50 MG tablet          No Known Allergies    No flowsheet data found.    Labs/Studies:    I reviewed the efficacy and compliance report from his device. Data summarized on the HPI and the PAP compliance flow sheet.     Patient verbalized understanding of these issues, agrees with the plan and all questions were answered today. Patient was given an opportuntity to voice any other symptoms or concerns not listed above. Patient did not have any other symptoms or concerns.      Luc Mace DO  Board Certified in Internal Medicine and Sleep Medicine    (Note created with Dragon voice recognition and unintended spelling errors and word substitutions may occur)     I spent a total of 11 minutes of phone encounter and in preparation for this clinic visit.    Audio and visual devices were used for this virtual clinic visit with permission from patient.

## 2020-09-02 ENCOUNTER — VIRTUAL VISIT (OUTPATIENT)
Dept: SLEEP MEDICINE | Facility: CLINIC | Age: 75
End: 2020-09-02
Payer: MEDICARE

## 2020-09-02 DIAGNOSIS — G47.33 OBSTRUCTIVE SLEEP APNEA: Primary | ICD-10-CM

## 2020-09-02 PROCEDURE — 99442 ZZC PHYSICIAN TELEPHONE EVALUATION 11-20 MIN: CPT | Performed by: INTERNAL MEDICINE

## 2020-09-27 ENCOUNTER — RECORDS - HEALTHEAST (OUTPATIENT)
Dept: ADMINISTRATIVE | Facility: OTHER | Age: 75
End: 2020-09-27

## 2020-09-27 ENCOUNTER — RECORDS - HEALTHEAST (OUTPATIENT)
Dept: LAB | Facility: CLINIC | Age: 75
End: 2020-09-27

## 2020-09-27 LAB
C REACTIVE PROTEIN LHE: <0.1 MG/DL (ref 0–0.8)
ERYTHROCYTE [DISTWIDTH] IN BLOOD BY AUTOMATED COUNT: 12.9 % (ref 11–14.5)
ERYTHROCYTE [SEDIMENTATION RATE] IN BLOOD BY WESTERGREN METHOD: 11 MM/HR (ref 0–15)
HCT VFR BLD AUTO: 44.3 % (ref 40–54)
HGB BLD-MCNC: 14.3 G/DL (ref 14–18)
MCH RBC QN AUTO: 28 PG (ref 27–34)
MCHC RBC AUTO-ENTMCNC: 32.3 G/DL (ref 32–36)
MCV RBC AUTO: 87 FL (ref 80–100)
PLATELET # BLD AUTO: 240 THOU/UL (ref 140–440)
PMV BLD AUTO: 9.3 FL (ref 8.5–12.5)
RBC # BLD AUTO: 5.11 MILL/UL (ref 4.4–6.2)
WBC: 7.1 THOU/UL (ref 4–11)

## 2020-09-30 ENCOUNTER — RECORDS - HEALTHEAST (OUTPATIENT)
Dept: ADMINISTRATIVE | Facility: OTHER | Age: 75
End: 2020-09-30

## 2020-10-07 ENCOUNTER — RECORDS - HEALTHEAST (OUTPATIENT)
Dept: ADMINISTRATIVE | Facility: OTHER | Age: 75
End: 2020-10-07

## 2020-10-08 ENCOUNTER — COMMUNICATION - HEALTHEAST (OUTPATIENT)
Dept: FAMILY MEDICINE | Facility: CLINIC | Age: 75
End: 2020-10-08

## 2020-10-30 ENCOUNTER — RECORDS - HEALTHEAST (OUTPATIENT)
Dept: ADMINISTRATIVE | Facility: OTHER | Age: 75
End: 2020-10-30

## 2020-11-02 ENCOUNTER — COMMUNICATION - HEALTHEAST (OUTPATIENT)
Dept: FAMILY MEDICINE | Facility: CLINIC | Age: 75
End: 2020-11-02

## 2020-11-02 DIAGNOSIS — I63.439: ICD-10-CM

## 2020-11-03 ENCOUNTER — COMMUNICATION - HEALTHEAST (OUTPATIENT)
Dept: FAMILY MEDICINE | Facility: CLINIC | Age: 75
End: 2020-11-03

## 2021-04-09 ENCOUNTER — COMMUNICATION - HEALTHEAST (OUTPATIENT)
Dept: LAB | Facility: CLINIC | Age: 76
End: 2021-04-09

## 2021-04-14 ENCOUNTER — RECORDS - HEALTHEAST (OUTPATIENT)
Dept: ADMINISTRATIVE | Facility: OTHER | Age: 76
End: 2021-04-14

## 2021-04-15 ENCOUNTER — OFFICE VISIT - HEALTHEAST (OUTPATIENT)
Dept: FAMILY MEDICINE | Facility: CLINIC | Age: 76
End: 2021-04-15

## 2021-04-15 ENCOUNTER — COMMUNICATION - HEALTHEAST (OUTPATIENT)
Dept: FAMILY MEDICINE | Facility: CLINIC | Age: 76
End: 2021-04-15

## 2021-04-15 DIAGNOSIS — Z12.5 ENCOUNTER FOR SCREENING FOR MALIGNANT NEOPLASM OF PROSTATE: ICD-10-CM

## 2021-04-15 DIAGNOSIS — Z00.00 WELLNESS EXAMINATION: ICD-10-CM

## 2021-04-15 LAB
ANION GAP SERPL CALCULATED.3IONS-SCNC: 10 MMOL/L (ref 5–18)
BUN SERPL-MCNC: 18 MG/DL (ref 8–28)
CALCIUM SERPL-MCNC: 8.6 MG/DL (ref 8.5–10.5)
CHLORIDE BLD-SCNC: 109 MMOL/L (ref 98–107)
CHOLEST SERPL-MCNC: 103 MG/DL
CO2 SERPL-SCNC: 24 MMOL/L (ref 22–31)
CREAT SERPL-MCNC: 1.09 MG/DL (ref 0.7–1.3)
ERYTHROCYTE [DISTWIDTH] IN BLOOD BY AUTOMATED COUNT: 12.3 % (ref 11–14.5)
FASTING STATUS PATIENT QL REPORTED: YES
GFR SERPL CREATININE-BSD FRML MDRD: >60 ML/MIN/1.73M2
GLUCOSE BLD-MCNC: 120 MG/DL (ref 70–125)
HBA1C MFR BLD: 6.7 %
HCT VFR BLD AUTO: 42.4 % (ref 40–54)
HDLC SERPL-MCNC: 34 MG/DL
HGB BLD-MCNC: 13.9 G/DL (ref 14–18)
LDLC SERPL CALC-MCNC: 54 MG/DL
MCH RBC QN AUTO: 28.4 PG (ref 27–34)
MCHC RBC AUTO-ENTMCNC: 32.8 G/DL (ref 32–36)
MCV RBC AUTO: 87 FL (ref 80–100)
PLATELET # BLD AUTO: 203 THOU/UL (ref 140–440)
PMV BLD AUTO: 9.2 FL (ref 7–10)
POTASSIUM BLD-SCNC: 4.3 MMOL/L (ref 3.5–5)
PSA SERPL-MCNC: 4.2 NG/ML (ref 0–6.5)
RBC # BLD AUTO: 4.9 MILL/UL (ref 4.4–6.2)
SODIUM SERPL-SCNC: 143 MMOL/L (ref 136–145)
TRIGL SERPL-MCNC: 74 MG/DL
WBC: 5.6 THOU/UL (ref 4–11)

## 2021-04-15 ASSESSMENT — MIFFLIN-ST. JEOR: SCORE: 1740.97

## 2021-04-22 ENCOUNTER — COMMUNICATION - HEALTHEAST (OUTPATIENT)
Dept: FAMILY MEDICINE | Facility: CLINIC | Age: 76
End: 2021-04-22

## 2021-04-22 DIAGNOSIS — E11.9 TYPE 2 DIABETES MELLITUS WITHOUT COMPLICATION, WITHOUT LONG-TERM CURRENT USE OF INSULIN (H): ICD-10-CM

## 2021-04-23 ENCOUNTER — RECORDS - HEALTHEAST (OUTPATIENT)
Dept: ADMINISTRATIVE | Facility: OTHER | Age: 76
End: 2021-04-23

## 2021-04-23 ENCOUNTER — COMMUNICATION - HEALTHEAST (OUTPATIENT)
Dept: FAMILY MEDICINE | Facility: CLINIC | Age: 76
End: 2021-04-23

## 2021-05-03 ENCOUNTER — COMMUNICATION - HEALTHEAST (OUTPATIENT)
Dept: FAMILY MEDICINE | Facility: CLINIC | Age: 76
End: 2021-05-03

## 2021-05-03 DIAGNOSIS — I63.439: ICD-10-CM

## 2021-05-03 DIAGNOSIS — E78.00 HYPERCHOLESTEREMIA: ICD-10-CM

## 2021-05-07 ENCOUNTER — RECORDS - HEALTHEAST (OUTPATIENT)
Dept: ADMINISTRATIVE | Facility: OTHER | Age: 76
End: 2021-05-07

## 2021-05-27 NOTE — PROGRESS NOTES
Assessment:  1.  Non-insulin-dependent diabetes mellitus, checking status.  2.  Hyperlipidemia, checking status.  3.  Dysarthria.  4.  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  5.  Nasal congestion that is either allergic rhinitis or perennial rhinitis.    Plan: Check fasting A1c, lipid hepatic and basic profiles.  Renew medications.  Okay to try Allegra i.e. fexofenadine 180 mg once a day for the nasal congestion.  If that does not work he can try the fluticasone nasal spray available over-the-counter-1 squirt each day or can call for prescription.  Follow-up in 4 months but earlier as needed.  Do try to be careful with diet and pursue gradual weight reduction with goal of trying to get down to 200 pounds eventually.  Keep up excellent exercise program.    Subjective: 73-year-old male presenting for follow-up and/or evaluation of the above.  Regarding diabetes he does check blood sugars daily and brings in his readings and they are usually in the 110-140 range in the morning.  He does exercise 3 times a week at the , each time for 2 hours.  Keeps active also around the house.  Regarding lipids no diarrhea constipation muscle aching or muscle weakness.  He feels he continues to improve in terms of his speech.  His wife retired several years ago from teaching high school business.  She accompanies him today.  He has had nasal congestion for the last 6-12 months that is pretty much all the time.  No fever.  No colored drainage.  Just feels plugged in the nose and sinuses.  Patient Active Problem List   Diagnosis     Essential Hypercholesterolemia     Obstructive sleep apnea     Cerebral infarction due to embolism of posterior cerebral artery (H)     Vertebral artery stenosis, left     Pseudobulbar affect     Dysarthria     Non-insulin dependent type 2 diabetes mellitus (H)     No Known Allergies  Current Outpatient Medications   Medication Sig Dispense Refill     aspirin 81 MG EC tablet  "Take 81 mg by mouth daily.        atorvastatin (LIPITOR) 40 MG tablet Take 1 tablet (40 mg total) by mouth at bedtime. 90 tablet 1     blood glucose test (ONETOUCH ULTRA BLUE TEST STRIP) strips Use 1 each As Directed daily Dispense brand per patient's insurance at pharmacy discretion.. 100 strip 3     calcium carbonate (OS-JENY) 600 mg (1,500 mg) tablet Take 600 mg by mouth 2 (two) times a day with meals.       clopidogrel (PLAVIX) 75 mg tablet Take 1 tablet (75 mg total) by mouth daily. 90 tablet 1     lancets (ONETOUCH DELICA LANCETS) 33 gauge Misc Use 1 strip As Directed daily. 100 each 3     MULTIVITAMIN ORAL Take 1 tablet by mouth daily.       sertraline (ZOLOFT) 50 MG tablet Take 1 tablet (50 mg total) by mouth daily. 90 tablet 1     No current facility-administered medications for this visit.      All other review of systems are negative.    Objective:/60   Pulse 64   Ht 5' 11.5\" (1.816 m)   Wt 218 lb (98.9 kg)   SpO2 99%   BMI 29.98 kg/m    HEENT examination is negative except for nasal congestion.  No facial tenderness.  Neck supple without adenopathy or thyromegaly.  Lungs clear.  Heart regular rate and rhythm without murmur.  Abdomen shows no masses tenderness or hepatosplenomegaly.  No pedal edema.  He is alert.  Speech is definitely clearer than in past years.  He ambulates independently.  "

## 2021-05-30 VITALS — HEIGHT: 72 IN | WEIGHT: 209 LBS | BODY MASS INDEX: 28.31 KG/M2

## 2021-05-31 VITALS — BODY MASS INDEX: 29.88 KG/M2 | HEIGHT: 72 IN | WEIGHT: 220.6 LBS

## 2021-05-31 NOTE — PROGRESS NOTES
Assessment:  1.  Non-insulin-dependent diabetes mellitus, controlled.  2.  Hyperlipidemia, checking status.  3.  Dysarthria.  4.  History of stroke with residual mild balance difficulty.  Plan: Check fasting lipid hepatic and basic profiles.  He will schedule his annual wellness exam for the early part of October and at that time we will check his A1c given his good blood sugar readings today.  Then we will plan on him following up no later than late March early April for his next visit given his plans for being in [place] for the winter.  He will call return earlier as needed.  Filled out the application for the disability parking certificate renewal.  See that form.  Continue his excellent exercise and diet efforts etc.  Continue current medication.    Subjective: 73-year-old male presenting for follow-up on the above.  He notes that he goes to the  for exercise Monday Wednesday Friday and does aerobics as well as pool therapy.  He tries to exercise also daily otherwise.  He checks blood sugars daily in the morning is often in the 130 range and in the evening when he checks 2 hours after a meal it is nearly always below 160.  He notes that he is planning to go to [place] on November 1 for the winter and return about March 15 or so.  But then he needs medications to last for that time.  He does request getting new handicapped certificate.  It is related to his balance that he does not walk more than 200 feet without resting.  He does have a cane and a walker but he tries to not use them any more often than he has to.  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        atorvastatin (LIPITOR) 40 MG tablet Take 1 tablet (40 mg total) by mouth at bedtime. 90 tablet 1     blood glucose test (ONETOUCH ULTRA BLUE TEST STRIP) strips Use 1 each As Directed daily. Dispense brand per patient's insurance at pharmacy discretion. 100 strip 3     calcium carbonate (OS-JENY) 600 mg (1,500 mg) tablet Take 600 mg by mouth 2 (two) times a day with meals.       clopidogrel (PLAVIX) 75 mg tablet Take 1 tablet (75 mg total) by mouth daily. 90 tablet 1     lancets (ONETOUCH DELICA LANCETS) 33 gauge Misc Use 1 strip As Directed daily. 100 each 3     MULTIVITAMIN ORAL Take 1 tablet by mouth daily.       sertraline (ZOLOFT) 50 MG tablet Take 1 tablet (50 mg total) by mouth daily. 90 tablet 1     No current facility-administered medications for this visit.      All other review of systems are negative for any other changes.  He notes that his speech is not back to normal but other than the balance difficulty in the speech he does not feel he has any residual from the stroke.    Objective:/60   Pulse 69   Ht 5' 11\" (1.803 m)   Wt 206 lb (93.4 kg)   SpO2 97%   BMI 28.73 kg/m    HEENT examination shows no acute change.  Neck supple without adenopathy or thyromegaly.  Lungs clear.  Heart regular rate and rhythm without murmur.  Abdomen shows no masses tenderness or hepatosplenomegaly.  No pedal edema.  He does ambulate independently but he does have some difficulty with his balance and is careful with.  He does have some dysarthria but I am able to understand him without difficulty.  "

## 2021-05-31 NOTE — TELEPHONE ENCOUNTER
Patient Returning Call  Reason for call:  Patient called back.  Information relayed to patient:  Informed of Dr Royal's message listed below.  Patient states understanding and agrees with plan.  Patient has additional questions:  No  If YES, what are your questions/concerns:    Okay to leave a detailed message?: No call back needed

## 2021-05-31 NOTE — TELEPHONE ENCOUNTER
----- Message from Cesar Royal MD sent at 8/16/2019  9:12 AM CDT -----  Lipids well controlled, normal liver and kidney function and microalbumin. Follow up for wellness visit before going south [place] as we  Discussed.

## 2021-06-01 ENCOUNTER — RECORDS - HEALTHEAST (OUTPATIENT)
Dept: ADMINISTRATIVE | Facility: CLINIC | Age: 76
End: 2021-06-01

## 2021-06-01 VITALS — WEIGHT: 233 LBS | BODY MASS INDEX: 32.04 KG/M2

## 2021-06-01 VITALS — WEIGHT: 219 LBS | BODY MASS INDEX: 30.12 KG/M2

## 2021-06-01 VITALS — WEIGHT: 226 LBS | BODY MASS INDEX: 31.08 KG/M2

## 2021-06-01 VITALS — BODY MASS INDEX: 29.84 KG/M2 | WEIGHT: 217 LBS

## 2021-06-01 VITALS — BODY MASS INDEX: 29.57 KG/M2 | WEIGHT: 215 LBS

## 2021-06-01 NOTE — PROGRESS NOTES
Assessment:  1.  Right flank and mid back pain.  No rash at the current time.  2.  History of shingles in the past.  3.  Dysarthria from his previous stroke.    Plan: Check CBC, basic and hepatic profiles, urinalysis.  Symptomatic care with Tylenol as needed at this time.  If he has any rash occur in that area of the skin where he has the pain, then at minimum call us and depending on what it looks like to him possibly may need to be seen.    Subjective: 73-year-old male presents for evaluation of pain that he thinks may be shingles.  He states the pain started on Friday 3 days ago.  He feels the pain in the right mid back.  He states he had shingles in the past with pain in the similar area.  He has had no fever nausea or vomiting.  He has not seen any blood in the urine or blood in the stool.  No other symptoms besides the pain.  The pain is not constant.  It has been helped by using Tylenol.  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        atorvastatin (LIPITOR) 40 MG tablet Take 1 tablet (40 mg total) by mouth at bedtime. 90 tablet 1     blood glucose test (ONETOUCH ULTRA BLUE TEST STRIP) strips Use 1 each As Directed daily. Dispense brand per patient's insurance at pharmacy discretion. 100 strip 3     calcium carbonate (OS-JENY) 600 mg (1,500 mg) tablet Take 600 mg by mouth 2 (two) times a day with meals.        clopidogrel (PLAVIX) 75 mg tablet Take 1 tablet (75 mg total) by mouth daily. 90 tablet 1     lancets (ONETOUCH DELICA LANCETS) 33 gauge Misc Use 1 strip As Directed daily. 100 each 3     MULTIVITAMIN ORAL Take 1 tablet by mouth daily.       sertraline (ZOLOFT) 50 MG tablet Take 1 tablet (50 mg total) by mouth daily. 90 tablet 1     No current facility-administered medications for this visit.      All other review of systems are negative.    Objective:/62   Pulse 60   Temp 97.8  F (36.6  C) (Oral)   Wt 204 lb (92.5 kg)   BMI 28.45 kg/m    HEENT shows no acute change.  Neck supple without adenopathy.  Lungs clear.  Heart regular rate and rhythm without murmur.  Abdomen shows no masses tenderness or hepatosplenomegaly.  Examination of the skin of the back and the abdomen and chest does not show any rash at this time.    I am not able to find in the chart but when he had the previous shingles that he is talking about.

## 2021-06-02 VITALS — WEIGHT: 218 LBS | HEIGHT: 72 IN | BODY MASS INDEX: 29.53 KG/M2

## 2021-06-02 VITALS — WEIGHT: 216 LBS | BODY MASS INDEX: 29.71 KG/M2

## 2021-06-02 NOTE — PATIENT INSTRUCTIONS - HE
Advance Directive  Patient s advance directive was discussed and I am comfortable with the patient s wishes.  Patient Education   Personalized Prevention Plan  You are due for the preventive services outlined below.  Your care team is available to assist you in scheduling these services.  If you have already completed any of these items, please share that information with your care team to update in your medical record.  Health Maintenance   Topic Date Due     ZOSTER VACCINES (2 of 3) 06/07/2007     MEDICARE ANNUAL WELLNESS VISIT  05/28/2016     PNEUMOCOCCAL IMMUNIZATION 65+ LOW/MEDIUM RISK (2 of 2 - PPSV23) 05/28/2016     DIABETES HEMOGLOBIN A1C  10/04/2019     DIABETES FOLLOW-UP  10/04/2019     INFLUENZA VACCINE RULE BASED (1) 06/30/2020 (Originally 8/1/2019)     DIABETES OPHTHALMOLOGY EXAM  11/28/2019     FALL RISK ASSESSMENT  04/04/2020     DIABETES FOOT EXAM  08/15/2020     DIABETES URINE MICROALBUMIN  08/15/2020     COLONOSCOPY  05/01/2021     ADVANCE CARE PLANNING  08/25/2021     TD 18+ HE  09/18/2022

## 2021-06-02 NOTE — TELEPHONE ENCOUNTER
Patient's spouse dropped off a form from the [place] [employer].    Form is in Dr Royal's inbox.    10/17/19

## 2021-06-02 NOTE — PROGRESS NOTES
Assessment and Plan:   Annual wellness visit.  1. Medicare annual wellness visit, subsequent     2. Non-insulin dependent type 2 diabetes mellitus (H)  Glycosylated Hemoglobin A1c   3. Essential Hypercholesterolemia     4. Cerebral infarction due to embolism of right posterior cerebral artery (H)     5. Obstructive sleep apnea  Ambulatory referral to Sleep Medicine   6. Pseudobulbar affect  sertraline (ZOLOFT) 50 MG tablet   7. Dysarthria     8. Hypercholesteremia  atorvastatin (LIPITOR) 40 MG tablet   9. Cerebral infarction due to embolism of posterior cerebral artery (H)  atorvastatin (LIPITOR) 40 MG tablet    clopidogrel (PLAVIX) 75 mg tablet     Is okay for him to try taking the Zoloft as half a pill i.e. 25 mg.  But if he notes that he has more difficulty with his pseudobulbar affect issue, then he will return to taking 50 mg a day.  Refills done on his medications.  [place]  He is going to get the influenza immunization at the [facility] pharmacy.  I wrote a note for him regarding getting a second Pneumovax because of his being diabetic and after age 64 and he had had a previous one when he turned 64 and then he had the Prevnar 4 years ago.  Continue his excellent exercise etc.  Recommend he see ophthalmology yearly.  Did give him an honoring choices form and asked him to get that filled out and he would have his wife be his healthcare agent should anything happen to him.  I explained that form was different than his having a will or trust or power of  for financial matters.    The patient's current medical problems were reviewed.    I have had an Advance Directives discussion with the patient.  The following health maintenance schedule was reviewed with the patient and provided in printed form in the after visit summary:   Health Maintenance   Topic Date Due     ZOSTER VACCINES (2 of 3) 06/07/2007     MEDICARE ANNUAL WELLNESS VISIT  05/28/2016      PNEUMOCOCCAL IMMUNIZATION 65+ LOW/MEDIUM RISK (2 of 2 - PPSV23) 05/28/2016     DIABETES HEMOGLOBIN A1C  10/04/2019     DIABETES FOLLOW-UP  10/04/2019     INFLUENZA VACCINE RULE BASED (1) 06/30/2020 (Originally 8/1/2019)     DIABETES OPHTHALMOLOGY EXAM  11/28/2019     FALL RISK ASSESSMENT  04/04/2020     DIABETES FOOT EXAM  08/15/2020     DIABETES URINE MICROALBUMIN  08/15/2020     COLONOSCOPY  05/01/2021     ADVANCE CARE PLANNING  08/25/2021     TD 18+ HE  09/18/2022        Subjective:   Chief Complaint: Kevin Webb is an 73 y.o. male here for an Annual Wellness visit.   HPI: 73-year-old male with above issues of follows up here for annual wellness visit.  He wonders about stopping his Zoloft because he thinks it is making him tired.  He notes that he needs to take a nap every afternoon states that he never needed to do that in the past.  He has the dysarthria etc. from his past stroke but is otherwise doing well.  He has no other complaints.  He notes that his recent right flank pain did totally clear up.  He and his wife are going to [place] about November 1 and will be back in early March.    Review of Systems: No chest pains trouble breathing or leg swelling.  No fever nausea vomiting.  Please see above.  The rest of the review of systems are negative for all systems.    Patient Care Team:  Cesar Royal MD as PCP - General  Cesar Royal MD as Assigned PCP     Patient Active Problem List   Diagnosis     Essential Hypercholesterolemia     Obstructive sleep apnea     Cerebral infarction due to embolism of posterior cerebral artery (H)     Vertebral artery stenosis, left     Pseudobulbar affect     Dysarthria     Non-insulin dependent type 2 diabetes mellitus (H)     Past Medical History:   Diagnosis Date     Basilar artery thrombosis      Cancer (H)     SKIN      Hyperlipidemia      Sleep apnea      Stroke (H)     HERE WITH tia SYMPTOMS     Vertebral artery stenosis       Past Surgical History:    Procedure Laterality Date     JOINT REPLACEMENT       partial meniscectomy knee Right 10/14/2015    Dr. Dos Santos, [facility]  2015          IA KNEE SCOPE,DIAGNOSTIC      Description: Arthroscopy Knee Left;  Recorded: 2009;     IA KNEE SCOPE,DIAGNOSTIC      Description: Arthroscopy Knee Left;  Proc Date: 2007;     IA TOTAL KNEE ARTHROPLASTY Left     Description: Total Knee Arthroplasty;  Proc Date: 2009;  Comments: Dr. Richi Dos Santos at [facility]     THROMBECTOMY        Family History   Problem Relation Age of Onset     Breast cancer Mother      Heart disease Father      Diabetes Father       Social History     Socioeconomic History     Marital status:      Spouse name: Lindsey     Number of children: Not on file     Years of education: Not on file     Highest education level: Not on file   Occupational History     Not on file   Social Needs     Financial resource strain: Not on file     Food insecurity:     Worry: Not on file     Inability: Not on file     Transportation needs:     Medical: Not on file     Non-medical: Not on file   Tobacco Use     Smoking status: Former Smoker     Last attempt to quit: 1976     Years since quittin.7     Smokeless tobacco: Never Used   Substance and Sexual Activity     Alcohol use: Yes     Comment: less than one beer per week     Drug use: No     Sexual activity: Yes     Partners: Female   Lifestyle     Physical activity:     Days per week: Not on file     Minutes per session: Not on file     Stress: Not on file   Relationships     Social connections:     Talks on phone: Not on file     Gets together: Not on file     Attends Nondenominational service: Not on file     Active member of club or organization: Not on file     Attends meetings of clubs or organizations: Not on file     Relationship status: Not on file     Intimate partner violence:     Fear of current or ex partner: Not on file     Emotionally abused: Not on  "file     Physically abused: Not on file     Forced sexual activity: Not on file   Other Topics Concern     Not on file   Social History Narrative     Not on file      Current Outpatient Medications   Medication Sig Dispense Refill     aspirin 81 MG EC tablet Take 81 mg by mouth daily.        atorvastatin (LIPITOR) 40 MG tablet Take 1 tablet (40 mg total) by mouth at bedtime. 90 tablet 1     calcium carbonate (OS-JENY) 600 mg (1,500 mg) tablet Take 600 mg by mouth 2 (two) times a day with meals.       clopidogrel (PLAVIX) 75 mg tablet Take 1 tablet (75 mg total) by mouth daily. 90 tablet 1     MULTIVITAMIN ORAL Take 1 tablet by mouth daily.       sertraline (ZOLOFT) 50 MG tablet Take 1 tablet (50 mg total) by mouth daily. 90 tablet 1     blood glucose test (ONETOUCH ULTRA BLUE TEST STRIP) strips Use 1 each As Directed daily. Dispense brand per patient's insurance at pharmacy discretion. 100 strip 3     lancets (ONETOUCH DELICA LANCETS) 33 gauge Misc Use 1 strip As Directed daily. 100 each 3     No current facility-administered medications for this visit.       Objective:   Vital Signs:   Visit Vitals  /60   Pulse 60   Temp 97.7  F (36.5  C) (Oral)   Ht 5' 11\" (1.803 m)   Wt 204 lb (92.5 kg)   SpO2 98%   BMI 28.45 kg/m         VisionScreening:  No exam data present     PHYSICAL EXAM  Alert male.  Head number cephalic.  External ears and TMs are normal.  Eyes show pupils equal round and react to light and accommodation.  Nose and oropharynx are negative.  Neck supple without adenopathy or thyromegaly.  Lungs clear.  Heart regular rate and rhythm without murmur.  Abdomen shows no masses tenderness or hepatosplenomegaly.  Genitalia normal normal testes, no hernia.  Rectal examination shows mild smooth enlargement of the prostate with no focal nodules.  Extremities show no edema.  He does have a calcaneal valgus bilaterally, left greater than right.  He does have onychomycosis and most of the toenails.  No foot " ulcerations.  He is alert, he does have mild dysarthria, but he is able to be easily understood.    Assessment Results 10/3/2019   Activities of Daily Living No help needed   Instrumental Activities of Daily Living No help needed   Mini Cog Total Score 4   Some recent data might be hidden     A Mini-Cog score of 0-2 suggests the possibility of dementia, score of 3-5 suggests no dementia    Identified Health Risks:     Patient's advanced directive was discussed and I am comfortable with the patient's wishes.

## 2021-06-03 VITALS
DIASTOLIC BLOOD PRESSURE: 62 MMHG | SYSTOLIC BLOOD PRESSURE: 124 MMHG | BODY MASS INDEX: 28.45 KG/M2 | TEMPERATURE: 97.8 F | WEIGHT: 204 LBS | HEART RATE: 60 BPM

## 2021-06-03 VITALS — WEIGHT: 206 LBS | HEIGHT: 71 IN | BODY MASS INDEX: 28.84 KG/M2

## 2021-06-03 VITALS
SYSTOLIC BLOOD PRESSURE: 112 MMHG | HEART RATE: 60 BPM | TEMPERATURE: 97.7 F | DIASTOLIC BLOOD PRESSURE: 60 MMHG | OXYGEN SATURATION: 98 % | HEIGHT: 71 IN | BODY MASS INDEX: 28.56 KG/M2 | WEIGHT: 204 LBS

## 2021-06-04 VITALS
WEIGHT: 201.31 LBS | HEART RATE: 60 BPM | BODY MASS INDEX: 28.08 KG/M2 | SYSTOLIC BLOOD PRESSURE: 114 MMHG | DIASTOLIC BLOOD PRESSURE: 62 MMHG | OXYGEN SATURATION: 98 %

## 2021-06-04 VITALS — BODY MASS INDEX: 28.28 KG/M2 | WEIGHT: 202 LBS | HEIGHT: 71 IN

## 2021-06-05 VITALS
SYSTOLIC BLOOD PRESSURE: 122 MMHG | OXYGEN SATURATION: 99 % | BODY MASS INDEX: 30.52 KG/M2 | HEIGHT: 71 IN | HEART RATE: 65 BPM | WEIGHT: 218 LBS | DIASTOLIC BLOOD PRESSURE: 80 MMHG

## 2021-06-07 NOTE — PROGRESS NOTES
"Kevin Webb is a 74 y.o. male who is being evaluated via a billable telephone visit.      The patient has been notified of following:     \"This telephone visit will be conducted via a call between you and your physician/provider. We have found that certain health care needs can be provided without the need for a physical exam.  This service lets us provide the care you need with a short phone conversation.  If a prescription is necessary we can send it directly to your pharmacy.  If lab work is needed we can place an order for that and you can then stop by our lab to have the test done at a later time.    Telephone visits are billed at different rates depending on your insurance coverage. During this emergency period, for some insurers they may be billed the same as an in-person visit.  Please reach out to your insurance provider with any questions.    If during the course of the call the physician/provider feels a telephone visit is not appropriate, you will not be charged for this service.\"    Patient has given verbal consent to a Telephone visit? Yes    Patient would like to receive their AVS by AVS Preference: Alisson.    Additional provider notes: 74-year-old male needing follow-up on the below issues.  He notes that he and his wife bought a home and [place] and then they came back here at the end of March.  He notes that they do plan probably in 2 to 3 years to move permanently and year-round to [place].  But in the meantime they will go back and forth between the 2.  He notes that he is feeling his usual self.  He denies any new issues.  He states that he did reduce the Zoloft dose from 50 mg a day down to 25 mg a day.  He does not feel there has been any aggravation of the pseudo-bulbar affect and he is interested in further decreasing the medication.  Regarding the history of his stroke, is primarily been the residual of the pseudobulbar affect and his dysarthria and again he feels that " is doing well.  Regarding diabetes he is checking blood sugars most every day and this morning was 117.  He states that he either checks it in the morning or the evening and he feels his numbers have been well controlled.  His last A1c last October was 6.1%.  He is on medication for lipid control with the atorvastatin.  He remains on the aspirin and clopidogrel for stroke prevention.  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        atorvastatin (LIPITOR) 40 MG tablet Take 1 tablet (40 mg total) by mouth at bedtime. 90 tablet 1     blood glucose test (ONETOUCH ULTRA BLUE TEST STRIP) strips Use 1 each As Directed daily. Dispense brand per patient's insurance at pharmacy discretion. 100 strip 3     calcium carbonate (OS-JENY) 600 mg (1,500 mg) tablet Take 600 mg by mouth 2 (two) times a day with meals.       clopidogrel (PLAVIX) 75 mg tablet Take 1 tablet (75 mg total) by mouth daily. 90 tablet 1     MULTIVITAMIN ORAL Take 1 tablet by mouth daily.       sertraline (ZOLOFT) 50 MG tablet Take 1 tablet (50 mg total) by mouth daily. 90 tablet 1     lancets (ONETOUCH DELICA LANCETS) 33 gauge Misc Use 1 strip As Directed daily. 100 each 3     No current facility-administered medications for this visit.      He is actually taking the sertraline as 25 mg a day, not as above.  He does not smoke.  Little alcohol.  All other  review of systems are negative for any other changes.    Assessment/Plan:  1. Cerebral infarction due to embolism of right posterior cerebral artery (H)     2. Dysarthria     3. Non-insulin dependent type 2 diabetes mellitus (H)     4. Essential Hypercholesterolemia     5. Pseudobulbar affect       I explained it was okay for him to try taking the Zoloft 25 mg every other day and if he does well over the next 2 to 4 weeks he could try either stopping that or further lengthening that out.  I explained he does have the chance of recurrence of the pseudobulbar affect in stopping that medicine and he certainly can resume it if he has any difficulty or go back to the previous dose.  But if he needs to do that I asked him to let me know.  Then I recommend that he follow-up no later than July for a visit and be fasting for the recheck on his lab.  Call or return earlier as needed.  Let me know if any problems.  He can get refills on his medicines as needed.  Then longer-term he will want to get a primary care physician in [place] for when he is living there year-round.  I did let him know that I will be retiring at the end of July and that any of the physicians here at the clinic would be able to see him when I am gone.      Phone call duration:  11 minutes    Cesar Royal MD

## 2021-06-08 NOTE — TELEPHONE ENCOUNTER
Refill Approved    Rx renewed per Medication Renewal Policy. Medication was last renewed on 10/3/19.    Minerva Taylor, [facility] Triage/Med Refill 5/12/2020     Requested Prescriptions   Pending Prescriptions Disp Refills     clopidogreL (PLAVIX) 75 mg tablet [Pharmacy Med Name: CLOPIDOGREL 75MG] 90 tablet 0     Sig: TAKE 1 TABLET (75 MG TOTAL) BY MOUTH DAILY.       Clopidogrel/Prasugrel/Ticagrelor Refill Protocol Failed - 5/11/2020  8:30 AM        Failed - PCP or prescribing provider visit in past 6 months       Last office visit with prescriber/PCP: Visit date not found OR same dept: 9/23/2019 Cesar Royal MD OR same specialty: 9/23/2019 Cesar Royal MD Last physical: Visit date not found Last MTM visit: Visit date not found     Next appt within 3 mo: Visit date not found  Next physical within 3 mo: Visit date not found  Prescriber OR PCP: Cesar Royal MD  Last diagnosis associated with med order: 1. Cerebral infarction due to embolism of posterior cerebral artery (H)  - clopidogreL (PLAVIX) 75 mg tablet [Pharmacy Med Name: CLOPIDOGREL 75MG]; Take 1 tablet (75 mg total) by mouth daily.  Dispense: 90 tablet; Refill: 0  - atorvastatin (LIPITOR) 40 MG tablet [Pharmacy Med Name: ATORVASTATIN 40MG]; Take 1 tablet (40 mg total) by mouth at bedtime.  Dispense: 90 tablet; Refill: 0    2. Hypercholesteremia  - atorvastatin (LIPITOR) 40 MG tablet [Pharmacy Med Name: ATORVASTATIN 40MG]; Take 1 tablet (40 mg total) by mouth at bedtime.  Dispense: 90 tablet; Refill: 0    If protocol passes may refill for 6 months if within 3 months of last provider visit (or a total of 9 months).              Passed - Hemoglobin in past 12 months     Hemoglobin   Date Value Ref Range Status   09/23/2019 13.6 (L) 14.0 - 18.0 g/dL Final                atorvastatin (LIPITOR) 40 MG tablet [Pharmacy Med Name: ATORVASTATIN 40MG] 90 tablet 0     Sig: TAKE 1 TABLET (40 MG TOTAL) BY MOUTH AT BEDTIME.       Statins Refill Protocol  (Hmg CoA Reductase Inhibitors) Passed - 5/11/2020  8:30 AM        Passed - PCP or prescribing provider visit in past 12 months      Last office visit with prescriber/PCP: 9/23/2019 Cesar Royal MD OR same dept: 9/23/2019 Cesar Royal MD OR same specialty: 9/23/2019 Cesar Royal MD  Last physical: 10/3/2019 Last MTM visit: Visit date not found   Next visit within 3 mo: Visit date not found  Next physical within 3 mo: Visit date not found  Prescriber OR PCP: Cesar Royal MD  Last diagnosis associated with med order: 1. Cerebral infarction due to embolism of posterior cerebral artery (H)  - clopidogreL (PLAVIX) 75 mg tablet [Pharmacy Med Name: CLOPIDOGREL 75MG]; Take 1 tablet (75 mg total) by mouth daily.  Dispense: 90 tablet; Refill: 0  - atorvastatin (LIPITOR) 40 MG tablet [Pharmacy Med Name: ATORVASTATIN 40MG]; Take 1 tablet (40 mg total) by mouth at bedtime.  Dispense: 90 tablet; Refill: 0    2. Hypercholesteremia  - atorvastatin (LIPITOR) 40 MG tablet [Pharmacy Med Name: ATORVASTATIN 40MG]; Take 1 tablet (40 mg total) by mouth at bedtime.  Dispense: 90 tablet; Refill: 0    If protocol passes may refill for 12 months if within 3 months of last provider visit (or a total of 15 months).

## 2021-06-08 NOTE — TELEPHONE ENCOUNTER
RN cannot approve Refill Request    RN can NOT refill this medication Protocol failed and NO refill given.       Minerva Taylor, [facility] Triage/Med Refill 5/12/2020    Requested Prescriptions   Pending Prescriptions Disp Refills     clopidogreL (PLAVIX) 75 mg tablet [Pharmacy Med Name: CLOPIDOGREL 75MG] 90 tablet 3     Sig: TAKE 1 TABLET (75 MG TOTAL) BY MOUTH DAILY.       Clopidogrel/Prasugrel/Ticagrelor Refill Protocol Failed - 5/11/2020  8:30 AM        Failed - PCP or prescribing provider visit in past 6 months       Last office visit with prescriber/PCP: Visit date not found OR same dept: 9/23/2019 Cesar Royal MD OR same specialty: 9/23/2019 Cesar Royal MD Last physical: Visit date not found Last MTM visit: Visit date not found     Next appt within 3 mo: Visit date not found  Next physical within 3 mo: Visit date not found  Prescriber OR PCP: Cesar Royal MD  Last diagnosis associated with med order: 1. Cerebral infarction due to embolism of posterior cerebral artery (H)  - clopidogreL (PLAVIX) 75 mg tablet [Pharmacy Med Name: CLOPIDOGREL 75MG]; TAKE 1 TABLET (75 MG TOTAL) BY MOUTH DAILY.  Dispense: 90 tablet; Refill: 0  - atorvastatin (LIPITOR) 40 MG tablet; TAKE 1 TABLET (40 MG TOTAL) BY MOUTH AT BEDTIME.  Dispense: 90 tablet; Refill: 3    2. Hypercholesteremia  - atorvastatin (LIPITOR) 40 MG tablet; TAKE 1 TABLET (40 MG TOTAL) BY MOUTH AT BEDTIME.  Dispense: 90 tablet; Refill: 3    If protocol passes may refill for 6 months if within 3 months of last provider visit (or a total of 9 months).              Passed - Hemoglobin in past 12 months     Hemoglobin   Date Value Ref Range Status   09/23/2019 13.6 (L) 14.0 - 18.0 g/dL Final              Signed Prescriptions Disp Refills    atorvastatin (LIPITOR) 40 MG tablet 90 tablet 3     Sig: TAKE 1 TABLET (40 MG TOTAL) BY MOUTH AT BEDTIME.       Statins Refill Protocol (Hmg CoA Reductase Inhibitors) Passed - 5/11/2020  8:30 AM        Passed  - PCP or prescribing provider visit in past 12 months      Last office visit with prescriber/PCP: 9/23/2019 Cesar Royal MD OR same dept: 9/23/2019 Cesar Royal MD OR same specialty: 9/23/2019 Cesar Royal MD  Last physical: 10/3/2019 Last MTM visit: Visit date not found   Next visit within 3 mo: Visit date not found  Next physical within 3 mo: Visit date not found  Prescriber OR PCP: Cesar Royal MD  Last diagnosis associated with med order: 1. Cerebral infarction due to embolism of posterior cerebral artery (H)  - clopidogreL (PLAVIX) 75 mg tablet [Pharmacy Med Name: CLOPIDOGREL 75MG]; TAKE 1 TABLET (75 MG TOTAL) BY MOUTH DAILY.  Dispense: 90 tablet; Refill: 0  - atorvastatin (LIPITOR) 40 MG tablet; TAKE 1 TABLET (40 MG TOTAL) BY MOUTH AT BEDTIME.  Dispense: 90 tablet; Refill: 3    2. Hypercholesteremia  - atorvastatin (LIPITOR) 40 MG tablet; TAKE 1 TABLET (40 MG TOTAL) BY MOUTH AT BEDTIME.  Dispense: 90 tablet; Refill: 3    If protocol passes may refill for 12 months if within 3 months of last provider visit (or a total of 15 months).

## 2021-06-08 NOTE — PROGRESS NOTES
"Kevin Webb is a 74 y.o. male who is being evaluated via a billable telephone visit.      The patient has been notified of following:     \"This telephone visit will be conducted via a call between you and your physician/provider. We have found that certain health care needs can be provided without the need for a physical exam.  This service lets us provide the care you need with a short phone conversation.  If a prescription is necessary we can send it directly to your pharmacy.  If lab work is needed we can place an order for that and you can then stop by our lab to have the test done at a later time.    Telephone visits are billed at different rates depending on your insurance coverage. During this emergency period, for some insurers they may be billed the same as an in-person visit.  Please reach out to your insurance provider with any questions.    If during the course of the call the physician/provider feels a telephone visit is not appropriate, you will not be charged for this service.\"    Patient has given verbal consent to a Telephone visit? Yes    What phone number would you like to be contacted at? [phone]    Patient would like to receive their AVS by AVS Preference: [facility] [place] [facility]  Outpatient Sleep Medicine New Patient Consultation      Name: Kevin Webb          MRN# [id number]  Age: 74 y.o.           YOB: 1945    Date of Consultation: 5/22/2020  Consultation is requested by: Cesar Royal MD  [address] [place] [address]  Primary care provider: Cesar Royal MD        Assessment and Plan:  1. CB (obstructive sleep apnea)  Patient is being treated for Obstructive Sleep Apnea (CB) and has been stable over time.  His current machine is past the reasonable useful lifetime and today I ordered a replacement device programmed to deliver a fixed pressure of 10 cm water.  I also ordered replacement " "consumable supplies and patient tells me that he would like to try out a nasal mask where the hose is routed over the top of his head.  I asked the patient to schedule a follow-up visit 4 to 6 weeks after receiving his new machine to evaluate his compliance and confirmed effectiveness of therapy.  Phone numbers for the [facility] and [facility] were provided.    2. Non-insulin dependent type 2 diabetes mellitus (H)  Untreated sleep apnea can interfere with diabetes management; once sleep apnea is treated HbA1c may improve.      3. Cerebral infarction due to embolism of right posterior cerebral artery (H)  I discussed the negative and bidirectional interactions of untreated sleep apnea with heart conditions, including atrial fibrillation, congestive heart failure, and risk for myocardial infarction and stroke.    The patient's stroke occurred after his most recent overnight sleep study.  However, given his stable clinical status I deferred considering retesting until the CPAP data download with his new machine can be evaluated.              Chief Complaint: \"I need to order a new mask and a new machine\"      History of Present Illness:    Kevin Webb is a 74 y.o. male who had a telephone consultation today.  The telephone visit was indicated due to the shelter-in-place mandate related to the COVID-19 pandemic; I spent 16 minutes talking to the patient and arranging care.      Review of the patient's medical record shows a split-night polysomnogram study conducted on November 7, 2011.  It is a split-night sleep study and his weight at that time was 201 pounds.  The AHI was 23.6/h and the oxyhemoglobin saturation peace was 83%.  A pressure of 10 cmH2O was effective in resolving his sleep disordered breathing.  Patient was prescribed CPAP at 6 pressure.    Patient reports doing well with CPAP and uses it on a nightly basis.  I do not have compliance data to review today.  He tells me that he " is able to use the therapy throughout the entire sleep period.  However, he is experiencing some discomfort with his mask because it has been a long time since he has renewed his consumable supplies.  He is also concerned that his machine may stop working and leave him without therapy.  CPAP improves his sleep quality and he no longer snores or wakes up frequently during the night.  He feels rested during the day and denies excessive daytime sleepiness.  He perceives no compromise to his driving safety and resultant sleepiness.  His score on the [place] Sleepiness Scale is in the normal range at 6 out of 24.    Medical history is significant for diabetes with an HbA1c at 6.1%.  He characterizes this as a prediabetic state and is managing it with lifestyle changes.  He had a cerebral infarction in November 2016 and has had partial recovery with residual effects on his balance and speech.  He has had no problems with swallowing.  He denies hypertension or other chronic medical problems.              Medications:      Current Outpatient Medications   Medication Sig Dispense Refill     aspirin 81 MG EC tablet Take 81 mg by mouth daily.        atorvastatin (LIPITOR) 40 MG tablet TAKE 1 TABLET (40 MG TOTAL) BY MOUTH AT BEDTIME. 90 tablet 3     blood glucose test (ONETOUCH ULTRA BLUE TEST STRIP) strips Use 1 each As Directed daily. Dispense brand per patient's insurance at pharmacy discretion. 100 strip 3     calcium carbonate (OS-JENY) 600 mg (1,500 mg) tablet Take 600 mg by mouth 2 (two) times a day with meals.       clopidogreL (PLAVIX) 75 mg tablet TAKE 1 TABLET (75 MG TOTAL) BY MOUTH DAILY. 90 tablet 1     MULTIVITAMIN ORAL Take 1 tablet by mouth daily.       sertraline (ZOLOFT) 50 MG tablet Take 1 tablet (50 mg total) by mouth daily. 90 tablet 1     lancets (ONETOUCH DELICA LANCETS) 33 gauge Misc Use 1 strip As Directed daily. 100 each 3     No current facility-administered medications for this visit.          No  "Known Allergies      Past Medical History:    Past Medical History:   Diagnosis Date     Basilar artery thrombosis      Cancer (H)     SKIN      Hyperlipidemia      Sleep apnea      Stroke (H)     HERE WITH tia SYMPTOMS     Vertebral artery stenosis          Past Surgical History:    Past Surgical History:   Procedure Laterality Date     JOINT REPLACEMENT       partial meniscectomy knee Right 10/14/2015    Dr. Dos Santos, [facility]  2015          NE KNEE SCOPE,DIAGNOSTIC      Description: Arthroscopy Knee Left;  Recorded: 2009;     NE KNEE SCOPE,DIAGNOSTIC      Description: Arthroscopy Knee Left;  Proc Date: 2007;     NE TOTAL KNEE ARTHROPLASTY Left     Description: Total Knee Arthroplasty;  Proc Date: 2009;  Comments: Dr. Richi Dos Santos at [facility]     THROMBECTOMY           Social History:    Social History     Tobacco Use     Smoking status: Former Smoker     Last attempt to quit: 1976     Years since quittin.4     Smokeless tobacco: Never Used   Substance Use Topics     Alcohol use: Yes     Comment: less than one beer per week         Family History:    Family History   Problem Relation Age of Onset     Breast cancer Mother      Heart disease Father      Diabetes Father          Review of Systems:    A complete 10 point review of systems was negative other than HPI or as commented below.      Physical Examination:  Ht 5' 11\" (1.803 m)   Wt 202 lb (91.6 kg)   BMI 28.17 kg/m    Deferred due to phone visit.     Data: All pertinent previous laboratory data reviewed.    No results found for: PH  No results found for: TSH  No components found for: GLC  Lab Results   Component Value Date    HGB 13.6 (L) 2019    HGB 13.5 (L) 2016     Lab Results   Component Value Date    BUN 14 2019    BUN 16 08/15/2019     Lab Results   Component Value Date    AST 23 2019    AST 24 08/15/2019    ALT 33 2019    ALT 29 08/15/2019    ALKPHOS " 71 09/23/2019    ALKPHOS 82 08/15/2019     No components found for: UAMP, UBARB, BENZODIAZEUR, UCANN, UCOC, OPIT, UPCP    Parts of this record were completed using medical speech recognition software which may produce unintended errors in voice-to-text transcription.      Neela Gamble MD  5/22/2020  [address], [address]  [place]  [address]  [phone]    Copy to: Cesar Royal MD Nicole Halonen, MA

## 2021-06-08 NOTE — PROGRESS NOTES
Order for Durable Medical Equipment was processed and equipment ordered.     DME provider: [facility]    Date Faxed: 5/22/2020    Ordering Provider: Neela Gamble    PAP Order Type: Replacement device, Mask/supply order    Fax Number: Sent to [place]: [facility]

## 2021-06-09 NOTE — PROGRESS NOTES
Chief Complaint   Patient presents with     Follow-up     med check        HPI: 74-year-old male with a history of pseudobulbar affect disorder, status post CVA, and NIDDM, presents today for recheck.  He spends his winter times in [place], he is active, and has no concerns.  He has lost 15 pounds of the weight currently is 201.  He goes to the [facility] 5 days a week.    He has been on Plavix for quite some time, since his stroke.  He would like to discontinue it and is currently taking one half of a tablet every other day.  He has had no bleeding difficulties and continues on aspirin.    ROS: No chest pain or shortness of breath or bowel or bladder issues.    SH: Reviewed-see Snapshot for review.     FH: Reviewed-see Snapshot for review.    Meds:  Kevin has a current medication list which includes the following prescription(s): aspirin, atorvastatin, blood glucose test, calcium carbonate, clopidogrel, multivitamin, sertraline, and lancets.    O:  /62 (Patient Position: Sitting, Cuff Size: Adult Large)   Pulse 60   Wt 201 lb 5 oz (91.3 kg)   SpO2 98%   BMI 28.08 kg/m    Constitutional:    --Vitals as above  --No acute distress  Eyes-  --Sclera noninjected  --Lids and conjunctiva normal  ENT-  --TMs clear  --Sclera noninjected  --Pharynx not erythematous  Neck-  --Neck supple    Lymph-  --No cervical lymphadenopathy  Lungs-  --Clear to Auscultation  Heart-  --Regular rate and rhythm  Skin-  --Pink and dry    A/P:   1. Non-insulin dependent type 2 diabetes mellitus (H)  Stable.  Encourage exercise and weight loss.  Will check A1c today.  - Glycosylated Hemoglobin A1c    2. Cerebral infarction due to embolism of right posterior cerebral artery (H)  Patient would like to discontinue Plavix and we had a long talk concerning the data.  Certainly adding Plavix to aspirin is controversy L but given the fact he is so far out from the CVA and the fact he is already weaned himself without any side effects is  reassuring.  We agreed to discontinue clopidogrel.  - HM2(CBC w/o Differential)  - Basic Metabolic Panel    3.  Pseudobulbar affect  -Continue Zoloft    RTC 6 months

## 2021-06-09 NOTE — PROGRESS NOTES
Assessment:  1.  History of CVA due to embolism of posterior cerebral artery  #2.  Hyperlipidemia, check his status.  3.  Underlying obstructive sleep apnea followed by sleep clinic.  #4.  Pseudobulbar affect controlled well with sertraline.  #5.  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  #6.  Mild gait instability due to his stroke.    Plan: Check fasting lipid hepatic and basic profiles.  Renewed his atorvastatin, clopidogrel, and sertraline.  Follow up in 6 months or earlier as needed.  Continue his excellent exercise regimen and diet etc.    Subjective: 71-year-old male presented for follow-up of the above and here with his wife today.  He notes he's been doing well and is stable.  When he had his stroke is residual difficulty is the mild difficulty with his speech and mild difficulty with his balance.  He notes that he goes to the [facility] and works out for 3 hours 3 times a week and sometimes daily.  He has not needed to use a steady cane or walker, he is careful with his walk and keep strong with his exercise.  He was put on sertraline after the stroke because of having difficulty with crying or laughing when the opposite would be appropriate.  He feels there's been no change over the last 6-12 months and he is happy with his current regimen.  He is fasting this morning.  Past Medical History:   Diagnosis Date     Basilar artery thrombosis      Cancer     SKIN      Hyperlipidemia      Sleep apnea      Stroke     HERE WITH tia SYMPTOMS     Vertebral artery stenosis      No Known Allergies  Current Outpatient Prescriptions   Medication Sig Dispense Refill     aspirin 81 MG EC tablet Take 81 mg by mouth daily.        atorvastatin (LIPITOR) 40 MG tablet Take 1 tablet (40 mg total) by mouth every evening. 30 tablet 5     calcium carbonate (OS-JENY) 600 mg (1,500 mg) tablet Take 600 mg by mouth 2 (two) times a day with meals.       clopidogrel (PLAVIX) 75 mg tablet Take 1 tablet (75 mg total) by mouth daily. 30  "tablet 5     L.ACID/L.CASEI/B.BIF/B.ANTONIO/FOS (PROBIOTIC BLEND ORAL) Take 1 tablet by mouth daily.       MULTIVITAMIN ORAL Take 1 tablet by mouth daily.       sertraline (ZOLOFT) 50 MG tablet Take 1 tablet (50 mg total) by mouth daily. 30 tablet 5     tablet cutter Misc As directed. Auto PAP [id number] , heated humidifier, mask, headgear, filters and tubing. For home use. Length of Need:99       No current facility-administered medications for this visit.      All other review of systems currently negative.    Objective:  Visit Vitals     /66     Pulse 68     Resp 14     Ht 5' 11.5\" (1.816 m)     Wt 209 lb (94.8 kg)     BMI 28.74 kg/m2     Head normocephalic.  External ears and TMs normal.  Eyes nose and oropharynx are unremarkable except that he does have some conjunctival erythema in the left thigh which she asks about.  He notes that will happen periodically.  He did see the ophthalmologist last October and everything was okay.  He has had no mattering.  No discomfort in the eye.  His vision is fine.  Neck supple without adenopathy or thyromegaly.  Lungs clear.  Heart regular rate and rhythm without murmur.  Abdomen shows no masses tenderness or hepatosplenomegaly.  No pedal edema.  He does ambulate independently but carefully.  He has slight slurring of his speech but communicates well.  "

## 2021-06-12 NOTE — TELEPHONE ENCOUNTER
Patient's spouse dropped off a form for the [place] [employer]. The patient has requested Dr. Pelaez complete the form and then mail in the attached envelope.    Form is in Dr. Pelaez's inbox.    11/3/2020

## 2021-06-12 NOTE — TELEPHONE ENCOUNTER
Called pt and notified of information below. Wife and  are saying that he was only supposed to stop it for 7 days due to a nerve root injection in L3 and L4 in back. Currently in [place]. Have enough medication for about 12 days. Still needs to be on medication.     Valeria Jolly, [facility]

## 2021-06-12 NOTE — TELEPHONE ENCOUNTER
Left message to call back for: Ashlee Chan, [employer].   Information to relay to patient:  Left detailed message as to when his procedure is? Date of procedure?

## 2021-06-12 NOTE — TELEPHONE ENCOUNTER
Left message to call back for: pt or pt wife Lindsey  Information to relay to patient:  No, answer. Busy signal or rang and rang. Please ask when pt's procedure is?

## 2021-06-12 NOTE — TELEPHONE ENCOUNTER
Patient Returning Call  Reason for call:  Return call.  Information relayed to patient:  Felicitas from [facility] was asked if there was a form that the clinic needed to fill out.   Patient has additional questions:  Yes  If YES, what are your questions/concerns:    Okay to leave a detailed message?: No    Caller stated the form is just to [place] yes or no about the patient's blood thinner. Caller stated the patient is scheduled for his steroid injection on 10/14/20 and they need an answer on if the patient will need any bridging. Caller stated the patient was already holding his blood thinner for a week now.

## 2021-06-12 NOTE — TELEPHONE ENCOUNTER
RN cannot approve Refill Request    RN can NOT refill this medication pt reports is taking 1/2 tablet daily. Last office visit: 9/23/2019 Cesar Royal MD Last Physical: 10/3/2019 Last MTM visit: Visit date not found Last visit same specialty: 7/21/2020 Scott Pelaez MD.  Next visit within 3 mo: Visit date not found  Next physical within 3 mo: Visit date not found      Minerva Taylor, [facility] Triage/Med Refill 11/5/2020    Requested Prescriptions   Pending Prescriptions Disp Refills     clopidogreL (PLAVIX) 75 mg tablet [Pharmacy Med Name: *CLOPIDOGREL 75MG] 90 tablet 0     Sig: TAKE 1 TABLET (75 MG TOTAL) BY MOUTH DAILY.       Clopidogrel/Prasugrel/Ticagrelor Refill Protocol Passed - 11/2/2020  8:48 AM        Passed - PCP or prescribing provider visit in past 6 months       Last office visit with prescriber/PCP: Visit date not found OR same dept: 7/21/2020 Scott Pelaez MD OR same specialty: 7/21/2020 Scott Pelaez MD Last physical: Visit date not found Last MTM visit: Visit date not found     Next appt within 3 mo: Visit date not found  Next physical within 3 mo: Visit date not found  Prescriber OR PCP: Cesar Royal MD  Last diagnosis associated with med order: 1. Cerebral infarction due to embolism of posterior cerebral artery (H)  - clopidogreL (PLAVIX) 75 mg tablet [Pharmacy Med Name: *CLOPIDOGREL 75MG]; TAKE 1 TABLET (75 MG TOTAL) BY MOUTH DAILY.  Dispense: 90 tablet; Refill: 0    If protocol passes may refill for 6 months if within 3 months of last provider visit (or a total of 9 months).              Passed - Hemoglobin in past 12 months     Hemoglobin   Date Value Ref Range Status   09/27/2020 14.3 14.0 - 18.0 g/dL Final

## 2021-06-12 NOTE — TELEPHONE ENCOUNTER
Tried to call back and ask WHEN procedure is. No answer. Busy signal. Will try again.   Pt should have pre op exam if paperwork is needed to be completed or he has questions regarding this procedure.

## 2021-06-12 NOTE — TELEPHONE ENCOUNTER
Injection procedure done in pt's L nerve root injection L3 and L4. Pt has been holding since Wednesday x 6 days.      [place] is trying to find out if pt does not need bridging.

## 2021-06-12 NOTE — TELEPHONE ENCOUNTER
Wife dropped off order form on patient from [facility]    Needs to be completed and faxed      Placed in Dr. Pelaez's inbox    10/08/20

## 2021-06-12 NOTE — TELEPHONE ENCOUNTER
Copy to scan, mailed as requested.   24yM with PMH vertigo 2-3 years ago p/w dizziness X 1 month a/w intermittent HA, L facial numbness and R leg numbness. Dizziness is worst when lying flat and is a sensation of the "room spinning". pt saw his neurologist Dr Sweet in [place] who prescribed an mri head and c spine which he is going to make an appointment for. pt comes in today because he noted he was having palpitations this morning as well as sensation of R ear muffled. pt states he had a uri in september. no weight loss ataxia confusion behavior changes. pt states when he walks he tends to veer left. no weakness. Of note, L pupil is blown out from an old paintball injury. no new vision changes.

## 2021-06-12 NOTE — TELEPHONE ENCOUNTER
Patient Returning Call  Reason for call:  procedure  Information relayed to patient:  The writer read the following to patient per CMA: Please ask when pt's procedure is? The procedure is a steriod injection in the lower back.     Patient has additional questions:  Yes  If YES, what are your questions/concerns:  Does he need to be seen for this.  Writer shared the provider did not express that yet.    Okay to leave a detailed message?: Yes

## 2021-06-13 NOTE — PROGRESS NOTES
Assessment:  1.  History of CVA with residual dysarthria and mild right facial droop.  2.  Hyperlipidemia checking status.  3.  History of mood disorder related to the stroke, controlled well on current regimen.    Plan: Check fasting lipid hepatic and basic profiles.  Continue current medication and refills as needed.  Follow-up in 6 months for next med check or earlier as needed.  Continue his excellent exercise regimen.  He wants to get his influenza immunization at the [facility] pharmacy.    Subjective: 71-year-old male presenting for follow-up of the above.  He notes it has been almost 2 years now since his stroke.  He had been told that he would have his maximum improvement within that time.  He has residual mild difficulty with his speech and movement but has worked hard with his exercise program and feels he is doing well.  He goes to the fitness center 3 times a week and exercises for several hours.  His wife is going with him now.  His appetite is okay.  He notes no problem with interest in doing things, no trouble with his moods, sleeping well etc.  He is happy with his progress.  Regarding lipids no diarrhea constipation muscle aching or muscle weakness.  He notes no trouble taking the atorvastatin.  Past Medical History:   Diagnosis Date     Basilar artery thrombosis      Cancer     SKIN      Hyperlipidemia      Sleep apnea      Stroke     HERE WITH tia SYMPTOMS     Vertebral artery stenosis      No Known Allergies  Current Outpatient Prescriptions   Medication Sig Dispense Refill     aspirin 81 MG EC tablet Take 81 mg by mouth daily.        atorvastatin (LIPITOR) 40 MG tablet Take 1 tablet (40 mg total) by mouth every evening. 30 tablet 5     calcium carbonate (OS-JENY) 600 mg (1,500 mg) tablet Take 600 mg by mouth 2 (two) times a day with meals.       clopidogrel (PLAVIX) 75 mg tablet Take 1 tablet (75 mg total) by mouth daily. 30 tablet 5     L.ACID/L.CASEI/B.BIF/B.ANTONIO/FOS (PROBIOTIC BLEND ORAL) Take 1  "tablet by mouth daily.       MULTIVITAMIN ORAL Take 1 tablet by mouth daily.       sertraline (ZOLOFT) 50 MG tablet Take 1 tablet (50 mg total) by mouth daily. 30 tablet 5     tablet cutter Misc As directed. Auto PAP [id number] , heated humidifier, mask, headgear, filters and tubing. For home use. Length of Need:99       No current facility-administered medications for this visit.      All other review systems are currently negative.    Objective:/64 (Patient Site: Left Arm, Patient Position: Sitting, Cuff Size: Adult Large)  Pulse 70 Comment: regular  Temp 98.3  F (36.8  C) (Oral)   Resp 14 Comment: regular  Ht 5' 11.5\" (1.816 m)  Wt 220 lb 9.6 oz (100.1 kg)  BMI 30.34 kg/m2  HEENT examination shows no acute change.  He has a very mild right facial droop.  Mild dysarthria present.  External ears and TMs okay nose and oropharynx unremarkable.  Neck supple without adenopathy or thyromegaly.  Lungs are clear.  Heart regular rate and rhythm without murmur.  Abdomen shows no masses tenderness or hepatosplenomegaly.  No pedal edema.  He is alert.  He is able to ambulate without any assistive device while he has minimal gait disturbance related to the previous stroke.  "

## 2021-06-15 PROBLEM — F48.2 PSEUDOBULBAR AFFECT: Status: ACTIVE | Noted: 2017-03-13

## 2021-06-15 PROBLEM — R47.1 DYSARTHRIA: Status: ACTIVE | Noted: 2017-03-13

## 2021-06-16 PROBLEM — E11.9 NON-INSULIN DEPENDENT TYPE 2 DIABETES MELLITUS (H): Status: ACTIVE | Noted: 2018-04-05

## 2021-06-16 NOTE — TELEPHONE ENCOUNTER
Refill Approved    Rx renewed per Medication Renewal Policy. Medication was last renewed on 11/29/18, 4/4/19.    Ray Gee, [facility] Triage/Med Refill 4/23/2021     Requested Prescriptions   Pending Prescriptions Disp Refills     ONETOUCH ULTRA BLUE TEST STRIP strips [Pharmacy Med Name: ONETOUCH ULTRA STRIPS]  PRN     Sig: USE ONCE DAILY E11.9       Diabetic Supplies Refill Protocol Passed - 4/22/2021 12:27 PM        Passed - Visit with PCP or prescribing provider visit in last 6 months     Last office visit with prescriber/PCP: 4/15/2021 Scott ePlaez MD OR same dept: 4/15/2021 Scott Pelaez MD OR same specialty: 4/15/2021 Scott Pelaez MD  Last physical: Visit date not found Last MTM visit: Visit date not found   Next visit within 3 mo: Visit date not found  Next physical within 3 mo: Visit date not found  Prescriber OR PCP: Scott Pelaez MD  Last diagnosis associated with med order: 1. Type 2 diabetes mellitus without complication, without long-term current use of insulin (H)  - ONETOUCH ULTRA BLUE TEST STRIP strips [Pharmacy Med Name: ONETOUCH ULTRA STRIPS]; USE ONCE DAILY E11.9; Refill: PRN  - ONETOUCH DELICA PLUS LANCET 33 gauge Misc [Pharmacy Med Name: ONETOUCH DELICA PLUS 33G]; CHECK BLOOD SUGAR ONCE PER DAY. E11.9  Dispense: 100 each; Refill: PRN    If protocol passes may refill for 12 months if within 3 months of last provider visit (or a total of 15 months).             Passed - A1C in last 6 months     Hemoglobin A1c   Date Value Ref Range Status   04/15/2021 6.7 (H) <=5.6 % Final                  ONETOUCH DELICA PLUS LANCET 33 gauge Misc [Pharmacy Med Name: ONETOUCH DELICA PLUS 33G] 100 each PRN     Sig: CHECK BLOOD SUGAR ONCE PER DAY. E11.9       Diabetic Supplies Refill Protocol Passed - 4/22/2021 12:27 PM        Passed - Visit with PCP or prescribing provider visit in last 6 months     Last office visit with prescriber/PCP: 4/15/2021 Scott Pelaez MD  OR same dept: 4/15/2021 Scott Pelaez MD OR same specialty: 4/15/2021 Scott Pelaez MD  Last physical: Visit date not found Last MTM visit: Visit date not found   Next visit within 3 mo: Visit date not found  Next physical within 3 mo: Visit date not found  Prescriber OR PCP: Scott Pelaez MD  Last diagnosis associated with med order: 1. Type 2 diabetes mellitus without complication, without long-term current use of insulin (H)  - ONETOUCH ULTRA BLUE TEST STRIP strips [Pharmacy Med Name: ONETOUCH ULTRA STRIPS]; USE ONCE DAILY E11.9; Refill: PRN  - ONETOUCH DELICA PLUS LANCET 33 gauge Misc [Pharmacy Med Name: ONETOUCH DELICA PLUS 33G]; CHECK BLOOD SUGAR ONCE PER DAY. E11.9  Dispense: 100 each; Refill: PRN    If protocol passes may refill for 12 months if within 3 months of last provider visit (or a total of 15 months).             Passed - A1C in last 6 months     Hemoglobin A1c   Date Value Ref Range Status   04/15/2021 6.7 (H) <=5.6 % Final

## 2021-06-16 NOTE — PROGRESS NOTES
S:  Patient presents for check of his chronic medical issues.  He and his wife are living 6 months out of the year in [place].  His hyperlipidemia remained stable on atorvastatin.  He checks his blood sugars daily and they are running well below 1:20 AM fasting.  He continues on clopidogrel with no bleeding issues.  He continues on Zoloft without difficulty.    Medications: Lipitor, calcium, Plavix, sertraline.    O:   Blood pressure 122/80, pulse 65, respiration 16, weight 218  Constitutional:    --Vitals as above  --No acute distress  Eyes-  --Sclera noninjected  --Lids and conjunctiva normal  ENT-  --TMs clear  --Sclera noninjected  --Pharynx not erythematous  Neck-  --Neck supple with no cervical lymphadenopathy  Lungs-  --Clear to Auscultation  Heart-  --Regular rate and rhythm  Abdomen--  --Soft, non-tender, non-distended  Skin-  --Pink and dry  Psych-  --Alert and oriented  --Normal affect      A:   Hyperlipidemia  Over nourishment  Pseudobulbar affect  NIDDM    P:   Encourage weight loss  Continue monitoring blood sugars closely  Continue Zoloft  Continue atorvastatin  CBC BMP lipid PSA  Encourage colonoscopy  RTC 1 year

## 2021-06-17 NOTE — PROGRESS NOTES
DIABETES CARE PLAN    Assessment/Plan:     Initial visit for newly diagnosed Type 2 diabetes. Instructed on what is diabetes and how to control it. Reviewed symptoms and treatment of high blood sugar.  Instructed on general diet guidelines and the importance of weight control and daily activity. Kevin has been working on eating healthier and exercising more. Noted he lost 15 pounds in the past 5 weeks.    Diabetes Medications:none    PLAN:  1. Eat smaller amounts more often. Avoid skipping meals.  2. Limit starch, fruit and milk to 1-1.5 cups per meal.  3. Avoid sugary drinks (regular soda, lemonade, sweetened tea and Gatorade).  4. Eat fresh fruit instead of juice.  5. Include high fiber, whole grain foods instead of processed white foods. Healthier choices are whole grain cereals, whole wheat pasta, brown or wild rice and whole wheat bread.  6. Continue exercise routine.  7.  a glucometer at [facility] and bring to next visit in 2 weeks.    Subjective/Objective:   Kevin Webb is a 72 y.o. male referred by Cesar Royal MD  Accompanied by: wife, Lindsey Portillo recently retired and they are exercising together and eating healthier    Food habits: Does not drink soda or large amounts of juice. Avoiding sweets  Eats 3 meals per day. Kevin does not get hungry but Lindsey reminds him to eat.   Breakfast: banana, black coffee and 2 oz juice with pills  Snack after working out: peanuts and string cheese, water  Lunch: sandwich on whole wheat, fruit, sometimes salad and water   Dinner: Stuffed green peppers or chicken, turkey or meatloaf with mashed potatoes, green beans, water or 2% milk  Bedtime snack: popcorn     Activity: [employer] 3 times per week for 2 hours  Does 2 exercise classes (Silver sneakers class with weights and resistance bands then water aerobics class)    Lab Results   Component Value Date    HGBA1C 6.8 (H) 04/05/2018     Monitoring   Will discuss at visit #2. With his 2 insurance plans it was  difficult to determine the preferred meter    Nutrition Management  Nutrition Management: Discussed and Literature provided  Healthy Snack Ideas: Discussed and Literature provided  Weight: Discussed and Literature provided  Plate method/Portions/Balance: Discussed     Physical Activity: Discussed and Literature provided    Diabetes Disease Process: Discussed and Literature provided  ABC: Discussed and Literature provided  A1c test and how it relates to meter numbers: Discussed and Literature provided  Goal Setting and Problem Solving: Discussed and Literature provided  Barriers: Assessed and history of cerebral infarct  Psychosocial Adjustments: Assessed    Time spent with the patient: 60 minutes   Visit type: Medical Nutrition Therapy, Initial (95295)  Previous education: No  Education hours available: DSMT 10 and MNT 2 until 5/8/2019  Diagnosis per referral:Type 2 Diabetes, newly diagnosed, without complications E11.9    Chelsea Fleming, RD, LD, CDE  5/8/2018  11:04 AM

## 2021-06-17 NOTE — PROGRESS NOTES
Assessment:  1.  New-onset type 2 diabetes mellitus.    Plan: Check microalbumin.  Reviewed the diagnosis of diabetes and the concept of insulin resistance.  He has already cut out sweets and been managing diet and has lost weight as result of that in this last week and half.  Recommend he see diabetic nurse educator for further instruction and learning how to use glucometer.  It is likely at this point that he will be able to be diet managed.  We will have him follow-up with me in about 3 months for next med check and fasting lab.  Reassured him that his monofilament testing of his feet was normal today.  Explained that the goal on his cholesterol management is the same as before.  Explained that we will want to continue to monitor kidney function.  Recommend regular exercise.  Explained that careful control of diabetes etc. does reduce risk of complications such as the eye issue, kidney, peripheral nerves etc.  Spent 25 minutes with least 50% counseling.  Referral placed for the diabetic educator.  He does see his ophthalmologist-Dr. Perry-once a year and last visit was in November 2017.  He notes that he has a freckle in his eye that they are also following.    Subjective: 72-year-old male presenting for discussion regarding new onset of type 2 diabetes.  Recent A1c was 6.8%.  See results.  He notes that he has been avoiding the buttered popcorn that he would eat frequently as well as avoiding sweets and drinking more water and has lost weight as result of that.  He is here with his wife today.  Past Medical History:   Diagnosis Date     Basilar artery thrombosis      Cancer     SKIN      Hyperlipidemia      Sleep apnea      Stroke     HERE WITH tia SYMPTOMS     Vertebral artery stenosis      No Known Allergies  Current Outpatient Prescriptions   Medication Sig Dispense Refill     aspirin 81 MG EC tablet Take 81 mg by mouth daily.        atorvastatin (LIPITOR) 40 MG tablet Take 1 tablet (40 mg total) by mouth  every evening. 90 tablet 2     calcium carbonate (OS-JENY) 600 mg (1,500 mg) tablet Take 600 mg by mouth 2 (two) times a day with meals.       clopidogrel (PLAVIX) 75 mg tablet Take 1 tablet (75 mg total) by mouth daily. 90 tablet 1     MULTIVITAMIN ORAL Take 1 tablet by mouth daily.       sertraline (ZOLOFT) 50 MG tablet Take 1 tablet (50 mg total) by mouth daily. 90 tablet 2     No current facility-administered medications for this visit.      Objective:/68  Pulse 60  Wt (!) 226 lb (102.5 kg)  BMI 31.08 kg/m2  Bilateral foot exam shows normal monofilament sensation in all areas tested of both feet.

## 2021-06-17 NOTE — PROGRESS NOTES
Assessment:  1.  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  2.  Pseudobulbar affect.  Controlled reasonably well.  3.  Mild dysarthria.  4.  Hyperlipidemia.  5.  Obstructive sleep apnea.    Plan: Check fasting lipid hepatic and basic profiles.  Continue current medication.  Continue excellent exercise regimen etc.  Follow-up in 6 months or earlier as needed.  He is scheduled for his screening colonoscopy.  Did review ways of reducing falls risk i.e. minimizing clutter in the house, avoiding loose rugs, etc. we discussed that the only medication that would be likely to have any effect on his libido was the sertraline.  All he could try using a lower dose of that, he would have the risk of having the symptoms of the pseudobulbar affect increase.  He did not want to try any change and I would agree with that.    Subjective: 72-year-old male presented for follow-up on the above.  He notes he is feeling the same.  He feels the sertraline definitely helped out with his symptoms of the pseudobulbar affect.  He asked if there are any of his medicines that could be affecting his libido.  He notes he does not have the same interest in sex.  He does go to the [employer] 3 days a week and does a regimen of both swimming and aerobic exercise and walking.  He does that for at least one hour 3 times a week.  Regarding the stroke he does not notice any change in his mild dysarthria, he notes that he has not fallen yet, he does not ever use a cane.  Regarding lipids no diarrhea constipation muscle aching or muscle weakness.  Past Medical History:   Diagnosis Date     Basilar artery thrombosis      Cancer     SKIN      Hyperlipidemia      Sleep apnea      Stroke     HERE WITH tia SYMPTOMS     Vertebral artery stenosis      No Known Allergies  Current Outpatient Prescriptions   Medication Sig Dispense Refill     aspirin 81 MG EC tablet Take 81 mg by mouth daily.        atorvastatin (LIPITOR) 40 MG tablet Take 1  tablet (40 mg total) by mouth every evening. 90 tablet 2     calcium carbonate (OS-JENY) 600 mg (1,500 mg) tablet Take 600 mg by mouth 2 (two) times a day with meals.       clopidogrel (PLAVIX) 75 mg tablet Take 1 tablet (75 mg total) by mouth daily. 90 tablet 1     MULTIVITAMIN ORAL Take 1 tablet by mouth daily.       sertraline (ZOLOFT) 50 MG tablet Take 1 tablet (50 mg total) by mouth daily. 90 tablet 2     No current facility-administered medications for this visit.      All other review of systems currently negative.    Objective:/70  Pulse 62  Resp 16  Wt (!) 233 lb (105.7 kg)  SpO2 94%  BMI 32.04 kg/m2  HEENT examination shows no acute change.  Neck supple without adenopathy or thyromegaly.  Lungs clear.  Heart regular rate and rhythm without murmur.  Abdomen shows no masses tenderness or hepatosplenomegaly.  No pedal edema.  He is alert, does have a mild dysarthria,  He ambulates independently and gait appears fairly normal.

## 2021-06-17 NOTE — TELEPHONE ENCOUNTER
Refill Approved    Rx renewed per Medication Renewal Policy. Medication was last renewed on 11/6/20.    Ray Gee, [facility] Triage/Med Refill 5/4/2021     Requested Prescriptions   Pending Prescriptions Disp Refills     clopidogreL (PLAVIX) 75 mg tablet [Pharmacy Med Name: CLOPIDOGREL 75MG] 90 tablet 0     Sig: TAKE 1 TABLET (75 MG TOTAL) BY MOUTH DAILY. PT STATES HE IS TAKING 1/2 TABLET A DAY       Clopidogrel/Prasugrel/Ticagrelor Refill Protocol Passed - 5/3/2021  9:12 AM        Passed - PCP or prescribing provider visit in past 6 months       Last office visit with prescriber/PCP: 4/15/2021 OR same dept: 4/15/2021 Scott Pelaez MD OR same specialty: 4/15/2021 Scott Pelaez MD Last physical: Visit date not found Last MTM visit: Visit date not found     Next appt within 3 mo: Visit date not found  Next physical within 3 mo: Visit date not found  Prescriber OR PCP: Scott Pelaez MD  Last diagnosis associated with med order: 1. Cerebral infarction due to embolism of posterior cerebral artery (H)  - clopidogreL (PLAVIX) 75 mg tablet [Pharmacy Med Name: CLOPIDOGREL 75MG]; Take 1 tablet (75 mg total) by mouth daily. Pt states he is taking 1/2 tablet a day  Dispense: 90 tablet; Refill: 0    If protocol passes may refill for 6 months if within 3 months of last provider visit (or a total of 9 months).              Passed - Hemoglobin in past 12 months     Hemoglobin   Date Value Ref Range Status   04/15/2021 13.9 (L) 14.0 - 18.0 g/dL Final

## 2021-06-17 NOTE — TELEPHONE ENCOUNTER
Refill Approved    Rx renewed per Medication Renewal Policy. Medication was last renewed on 5/12/20.    Ray Gee, [facility] Triage/Med Refill 5/4/2021     Requested Prescriptions   Pending Prescriptions Disp Refills     atorvastatin (LIPITOR) 40 MG tablet [Pharmacy Med Name: ATORVASTATIN 40MG] 90 tablet 2     Sig: TAKE 1 TABLET (40 MG TOTAL) BY MOUTH AT BEDTIME.       Statins Refill Protocol (Hmg CoA Reductase Inhibitors) Passed - 5/3/2021  9:12 AM        Passed - PCP or prescribing provider visit in past 12 months      Last office visit with prescriber/PCP: 9/23/2019 Cesar Royal MD OR same dept: 4/15/2021 Scott Pelaez MD OR same specialty: 4/15/2021 Scott Pelaez MD  Last physical: 10/3/2019 Last MTM visit: Visit date not found   Next visit within 3 mo: Visit date not found  Next physical within 3 mo: Visit date not found  Prescriber OR PCP: Cesar Royal MD  Last diagnosis associated with med order: 1. Hypercholesteremia  - atorvastatin (LIPITOR) 40 MG tablet [Pharmacy Med Name: ATORVASTATIN 40MG]; TAKE 1 TABLET (40 MG TOTAL) BY MOUTH AT BEDTIME.  Dispense: 90 tablet; Refill: 2    2. 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  Dispense: 90 tablet; Refill: 2    If protocol passes may refill for 12 months if within 3 months of last provider visit (or a total of 15 months).

## 2021-06-18 NOTE — PROGRESS NOTES
DIABETES CARE PLAN    Assessment/Plan:     Follow-up visit for diabetes education and counseling. Kevin has been eating less and avoiding juice and soda. Noted he lost 7 pounds in the past 2 weeks. Patient lost 24 pounds since the end of March.    Instructed on how to check his blood sugar using a One Touch Ultra 2 meter. Blood sugar after breakfast today was 107.     Kevin and Lindsey are leaving for [place] in 2 weeks. He has to bring his C-Pap machine. Patient prefers not to bring his glucometer on his trip which I think is reasonable.      Current Diabetes Medications: None    PLAN:   1. Check blood sugar once per day (before breakfast for 1 week, then 2 hours after dinner or bedtime).   2. Continue to limit starch, juice and sweets.  3. Continue to stay active every day.  4. Next diabetes check 7/23/18 with Dr. Royal.  5. Next visit with me in August or sooner if blood sugars are above target.     Subjective/Objective:     Kevin Webb is a 72 y.o. male referred by Cesar Royal MD. Accompanied by:  Wife Lindsey    Wt Readings from Last 3 Encounters:   05/22/18 219 lb (99.3 kg)   04/09/18 (!) 226 lb (102.5 kg)   03/29/18 (!) 233 lb (105.7 kg)     Food Recall: Eats 3 meals per day and minimal snacks. Patient does not get hungry so Lindsey reminds him to eat.  Details of food intake are on note from 5/8/18    Activity: [employer] 3 times per week for 2 hours. Does 2 exercise classes (1 Silver sneakers class with weights and resistance bands, then does a water aerobics class)    Alternative BG goals due to history of a stroke  Before meals ; 2 hours after meals: less 180    Lab Results   Component Value Date    HGBA1C 6.8 (H) 04/05/2018     EDUCATION RECORD      Monitoring   Meter: Discussed  Monitoring: Discussed and Literature provided  BG goals: Discussed and Literature provided    Nutrition Management  Nutrition Management: Discussed and Literature provided  Weight: Assessed and Discussed  Portions/Balance:  Assessed and Discussed  Carb ID/Count: Competent and Plate method of portion control. Will not be counting carbs  Label Reading: Competent  Heart Healthy Fats: Competent  Physical Activity: Discussed and Literature provided  Medications: N/A    Diabetes Disease Process: Competent    Acute Complications: Prevent, Detect, Treat:  Hypoglycemia: N/A  Hyperglycemia: Discussed and Needs instruction/review at follow-up  Sick Days: Not addressed    Chronic Complications  Foot Care:Not addressed  Skin Care: Not addressed  Eye: Not addressed  ABC: Discussed  Teeth:Not addressed  Goal Setting and Problem Solving: Discussed and Literature provided  Barriers: Assessed  Psychosocial Adjustments: Assessed    Time spent with the patient: 60 minutes   Previous Education: yes  Visit Type:Medical Nutrition Therapy (79505 / [id number])  Hours Remaining: DSMT 10 and MNT 1 until 5/8/2019    Diagnosis per referral:Type 2 diabetes, newly diagnosed, without complications (E11.9)    Chelsea Fleming, RD, LD, CDE  5/22/2018  10:22 AM

## 2021-06-19 NOTE — LETTER
Letter by Cesar Royal MD at      Author: Cesar Royal MD Service: -- Author Type: --    Filed:  Encounter Date: 4/5/2019 Status: (Other)         Kevin Webb  [address] [facility] [id number]             April 5, 2019         Dear Mr. Webb,    Below are the results from your recent visit:    Resulted Orders   Glycosylated Hemoglobin A1c   Result Value Ref Range    Hemoglobin A1c 6.5 (H) 3.5 - 6.0 %   Lipid Cascade   Result Value Ref Range    Cholesterol 104 <=199 mg/dL    Triglycerides 107 <=149 mg/dL    HDL Cholesterol 36 (L) >=40 mg/dL    LDL Calculated 47 <=129 mg/dL    Patient Fasting > 8hrs? Yes    Basic Metabolic Panel   Result Value Ref Range    Sodium 146 (H) 136 - 145 mmol/L    Potassium 4.8 3.5 - 5.0 mmol/L    Chloride 110 (H) 98 - 107 mmol/L    CO2 23 22 - 31 mmol/L    Anion Gap, Calculation 13 5 - 18 mmol/L    Glucose 122 70 - 125 mg/dL    Calcium 9.8 8.5 - 10.5 mg/dL    BUN 15 8 - 28 mg/dL    Creatinine 1.17 0.70 - 1.30 mg/dL    GFR MDRD Af Amer >60 >60 mL/min/1.73m2    GFR MDRD Non Af Amer >60 >60 mL/min/1.73m2    Narrative    Fasting Glucose reference range is 70-99 mg/dL per  American Diabetes Association (ADA) guidelines.   Hepatic Profile   Result Value Ref Range    Bilirubin, Total 0.7 0.0 - 1.0 mg/dL    Bilirubin, Direct 0.3 <=0.5 mg/dL    Protein, Total 6.9 6.0 - 8.0 g/dL    Albumin 4.2 3.5 - 5.0 g/dL    Alkaline Phosphatase 71 45 - 120 U/L    AST 26 0 - 40 U/L    ALT 34 0 - 45 U/L        Lipids well controlled, no significant abnormalities on liver and kidney function tests. Stable control of  diabetes.    Please call with questions or contact us using [employer].    Sincerely,        Electronically signed by Cesar Royal MD

## 2021-06-19 NOTE — PROGRESS NOTES
Assessment:  1.  Non-insulin-dependent diabetes mellitus, checking status.  2.  Hyperlipidemia.  3.  Almost 3 years status post CVA of posterior cerebral artery with residual mild difficulty with equilibrium and dysarthria.    Plan: Check fasting A1c, lipid hepatic and basic profiles.  Renewed atorvastatin, clopidogrel, and sertraline.  Follow-up in 4 months for next med check but earlier as needed.  Continue excellent exercise regimen, diet etc.    Subjective: 72-year-old male presenting for follow-up on the above.  Regarding diabetes he checks his blood sugars every evening about 2 hours after supper and it is mostly in the mid 100 range but occasionally can be around 200.  He definitely notes that if he does not follow the diet well the reading is higher and he does minimize her ice cream which she likes a lot.  He is fasting this morning.  Regarding lipids no diarrhea constipation muscle aching or muscle weakness.  Regarding his stroke that happened almost 3 years ago, he feels the slight difficulty with equilibrium and his speech are the remaining symptoms but he does note that they have gradually gotten better.  Past Medical History:   Diagnosis Date     Basilar artery thrombosis      Cancer (H)     SKIN      Hyperlipidemia      Sleep apnea      Stroke (H)     HERE WITH tia SYMPTOMS     Vertebral artery stenosis      No Known Allergies  Current Outpatient Prescriptions   Medication Sig Dispense Refill     aspirin 81 MG EC tablet Take 81 mg by mouth daily.        atorvastatin (LIPITOR) 40 MG tablet Take 1 tablet (40 mg total) by mouth at bedtime. 90 tablet 1     blood glucose test (ACCU-CHEK SMARTVIEW TEST STRIP) strips Use 1 each As Directed as needed. Check blood sugar once per day. Dispense brand per patient's insurance at pharmacy discretion. 100 strip 0     calcium carbonate (OS-JENY) 600 mg (1,500 mg) tablet Take 600 mg by mouth 2 (two) times a day with meals.       clopidogrel (PLAVIX) 75 mg tablet Take 1  tablet (75 mg total) by mouth daily. 90 tablet 1     generic lancets (ACCU-CHEK FASTCLIX) Check blood sugar once per day. Dispense brand per patient's insurance at pharmacy discretion. 102 each 0     MULTIVITAMIN ORAL Take 1 tablet by mouth daily.       sertraline (ZOLOFT) 50 MG tablet Take 1 tablet (50 mg total) by mouth daily. 90 tablet 1     No current facility-administered medications for this visit.      All other review of systems are negative for any other changes.    Objective:/62  Pulse 60  Resp 18  Wt 215 lb (97.5 kg)  SpO2 96%  BMI 29.57 kg/m2  HEENT shows no acute change.  Neck supple without adenopathy or thyromegaly.  Lungs clear.  Heart regular rate and rhythm without murmur.  Abdomen shows no masses tenderness or hepatosplenomegaly.  No pedal edema.  Is alert with clear speech.  He does ambulate without any assistive device and appears to do so without difficulty.

## 2021-06-20 NOTE — PROGRESS NOTES
Diabetes Care Plan    Assessment/Plan:     Education Assessment: Follow-up visit for diabetes education and counseling. Reviewed A1c and blood sugar goals. Discussed portion control and eating challenges. Instructed on preventing diabetes complications (tests and exams, foot care, sick days). Instructed on stress management techniques. Provided resources for how to continue to learn about diabetes: phone apps, web sites, diabetes magazines. Education topics completed.    Kevin continues to check his blood sugar once per day. The 30 day blood sugar average is 150. He is eating healthier and has decreased portions. On the occasion he may eat ice cream in the evening his blood sugar at bedtime is above goal.  Noted he lost 2 more pounds since our last visit. Patient is going to the [facility] 3 times per week for 2 hours (silver sneakers group class with weights and resistance bands and water aerobics class at each visit).    Plan:  1. Continue to check blood sugar once per day.  2. Continue to limit carbohydrates and eat smaller amounts more often.  3. Continue current exercise routine.  4. Future visits for diabetes education as needed.     Objective/Subjective:     Kevin Webb is a 72 y.o. male referred by Cesar Royal MD. Accompanied by:wife, Lindsey    Diabetes Medications: none    Blood sugar trends:  2 hours after dinner: 147, 161, 191, 133, 145, 125, 147, 198, 180, 135, 121, 151, 109, 162, 148    Wt Readings from Last 3 Encounters:   08/28/18 217 lb (98.4 kg)   07/23/18 215 lb (97.5 kg)   05/22/18 219 lb (99.3 kg)     Lab Results   Component Value Date    HGBA1C 6.2 (H) 07/23/2018     Monitoring   Meter: Discussed, Literature provided, Patient returned demonstration and Competent  Monitoring: Discussed, Literature provided and Competent  BG goals: Discussed and Literature provided and Competent    Nutrition Management  Nutrition Management: Discussed and Competent  Weight: Discussed and  Competent  Portions/Balance: Discussed and Competent  Carb ID/Count: Assessed, Discussed, Literature provided and Competent  Label Reading: Assessed, Discussed Literature provided and Competent  Heart Healthy Fats: Competent  Low sodium diet:  Physical Activity: Assessed and Competent  Medications: Competent    Diabetes Disease Process: Competent    Stress Management: Discussed, Literature provided and Competent    Acute Complications: Prevent, Detect, Treat:  Hypoglycemia: Assessed, Discussed and Competent  Hyperglycemia: Assessed, Discussed and Competent  Sick Days: Discussed, Literature provided and Competent    Chronic Complications  Foot Care: Discussed, Literature provided and Competent  Skin Care: Discussed, Literature provided and Competent  Eye: Discussed, Literature provided and Competent.  ABC: Discussed, Literature provided and Competent  Teeth: Discussed, Literature provided and Competent  Goal Setting and Problem Solving: Assessed, Discussed and Competent  Barriers: Assessed  Psychosocial Adjustments: Assessed    Time spent with the patient: 60 minutes  Visit Type: Medical Nutrition Therapy, Re-assessment (70275)  Hours Remaining: DSMT 10 and MNT none until 5/8/2019  Diagnosis per referral: Type 2 Diabetes, controlled E11.9      Chelsea Fleming, KARISHMA, LD, CDE  8/28/2018  11:49 AM

## 2021-06-20 NOTE — LETTER
Letter by Scott Pelaez MD at      Author: Scott Pelaez MD Service: -- Author Type: --    Filed:  Encounter Date: 7/22/2020 Status: (Other)         Kevin Webb  [address] [facility] [address]            July 22, 2020        Dear Mr. Webb,        Below are the results from your recent visit:    Resulted Orders   HM2(CBC w/o Differential)   Result Value Ref Range    WBC 4.9 4.0 - 11.0 thou/uL    RBC 4.96 4.40 - 6.20 mill/uL    Hemoglobin 13.8 (L) 14.0 - 18.0 g/dL    Hematocrit 41.7 40.0 - 54.0 %    MCV 84 80 - 100 fL    MCH 27.8 27.0 - 34.0 pg    MCHC 33.1 32.0 - 36.0 g/dL    RDW 12.7 11.0 - 14.5 %    Platelets 211 140 - 440 thou/uL    MPV 7.7 7.0 - 10.0 fL   Basic Metabolic Panel   Result Value Ref Range    Sodium 141 136 - 145 mmol/L    Potassium 5.1 (H) 3.5 - 5.0 mmol/L    Chloride 106 98 - 107 mmol/L    CO2 25 22 - 31 mmol/L    Anion Gap, Calculation 10 5 - 18 mmol/L    Glucose 108 70 - 125 mg/dL    Calcium 9.6 8.5 - 10.5 mg/dL    BUN 13 8 - 28 mg/dL    Creatinine 1.10 0.70 - 1.30 mg/dL    GFR MDRD Af Amer >60 >60 mL/min/1.73m2    GFR MDRD Non Af Amer >60 >60 mL/min/1.73m2    Narrative    Fasting Glucose reference range is 70-99 mg/dL per  American Diabetes Association (ADA) guidelines.   Glycosylated Hemoglobin A1c   Result Value Ref Range    Hemoglobin A1c 6.0 3.5 - 6.0 %         Arlye, your laboratory results are complete and everything looks good.  Your potassium is slightly high but nothing alarming.  Keep up the healthy lifestyle, and let us visit when you get back from [place] next spring.    Sincerely,        ELVIN Pelaez MD, MELINDA  [facility]  [phone]

## 2021-06-21 NOTE — LETTER
Letter by Scott Pelaez MD at      Author: Scott Pelaez MD Service: -- Author Type: --    Filed:  Encounter Date: 4/15/2021 Status: (Other)         Kevin Webb  [address] [facility] [address]            April 15, 2021        Dear Mr. Webb,        Below are the results from your recent visit:    Resulted Orders   HM2(CBC w/o Differential)   Result Value Ref Range    WBC 5.6 4.0 - 11.0 thou/uL    RBC 4.90 4.40 - 6.20 mill/uL    Hemoglobin 13.9 (L) 14.0 - 18.0 g/dL    Hematocrit 42.4 40.0 - 54.0 %    MCV 87 80 - 100 fL    MCH 28.4 27.0 - 34.0 pg    MCHC 32.8 32.0 - 36.0 g/dL    RDW 12.3 11.0 - 14.5 %    Platelets 203 140 - 440 thou/uL    MPV 9.2 7.0 - 10.0 fL   Basic Metabolic Panel   Result Value Ref Range    Sodium 143 136 - 145 mmol/L    Potassium 4.3 3.5 - 5.0 mmol/L    Chloride 109 (H) 98 - 107 mmol/L    CO2 24 22 - 31 mmol/L    Anion Gap, Calculation 10 5 - 18 mmol/L    Glucose 120 70 - 125 mg/dL    Calcium 8.6 8.5 - 10.5 mg/dL    BUN 18 8 - 28 mg/dL    Creatinine 1.09 0.70 - 1.30 mg/dL    GFR MDRD Af Amer >60 >60 mL/min/1.73m2    GFR MDRD Non Af Amer >60 >60 mL/min/1.73m2    Narrative    Fasting Glucose reference range is 70-99 mg/dL per  American Diabetes Association (ADA) guidelines.   Glycosylated Hemoglobin A1c   Result Value Ref Range    Hemoglobin A1c 6.7 (H) <=5.6 %   Lipid Cascade   Result Value Ref Range    Cholesterol 103 <=199 mg/dL    Triglycerides 74 <=149 mg/dL    HDL Cholesterol 34 (L) >=40 mg/dL    LDL Calculated 54 <=129 mg/dL    Patient Fasting > 8hrs? Yes    PSA (Prostatic-Specific Antigen), Annual Screen   Result Value Ref Range    PSA 4.2 0.0 - 6.5 ng/mL    Narrative    Method is Abbott Prostate-Specific Antigen (PSA)  Standard-WHO 1st International (90:10)         Concepcióne, your laboratory results look good.  Your hemoglobin crept up slightly.  Keep working on your exercise and weight loss.  Please plan to return in 6 months.    Sincerely,        ELVIN Pelaez MD,  MELINDA  [facility]  [phone]

## 2021-06-22 NOTE — PROGRESS NOTES
Assessment:  1.  Non-insulin-dependent diabetes mellitus, checking status.  2.  Hyperlipidemia, checking status.  3.  Dysarthria, chronic, due to previous cerebrovascular accident.  4.  Pseudobulbar affect controlled with sertraline.    Plan: Check fasting A1c, lipid hepatic and basic profiles.  Continue his excellent exercise and diet regimen.  Continue checking blood sugars daily.  Renewed prescription for his test strips and his lancets.  Renewed prescription for his atorvastatin, clopidogrel, and sertraline.  Follow-up in 4 months but earlier as needed.  He understands and agrees.    Subjective: 72-year-old male presenting for follow-up on the above.  Regarding diabetes he does check blood sugars daily and he brings in his readings in the morning readings are nearly all in the range, when he checks after supper they are often in the 140-170 range 2 hours after supper.  He does exercise at least 3 times a week for an hour at the [facility].  He does some yard work.  Regarding lipids no diarrhea constipation muscle aching or muscle weakness.  Regarding his speech, he feels that is stable.  He notes he can walk without a cane.  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        atorvastatin (LIPITOR) 40 MG tablet Take 1 tablet (40 mg total) by mouth at bedtime. 90 tablet 1     blood glucose  test (ONETOUCH ULTRA BLUE TEST STRIP) strips Use 1 each As Directed daily Dispense brand per patient's insurance at pharmacy discretion.. 100 strip 3     calcium carbonate (OS-JENY) 600 mg (1,500 mg) tablet Take 600 mg by mouth 2 (two) times a day with meals.       clopidogrel (PLAVIX) 75 mg tablet Take 1 tablet (75 mg total) by mouth daily. 90 tablet 1     lancets (ONETOUCH DELICA LANCETS) 33 gauge Misc Use 1 strip As Directed daily. 100 each 3     MULTIVITAMIN ORAL Take 1 tablet by mouth daily.       sertraline (ZOLOFT) 50 MG tablet Take 1 tablet (50 mg total) by mouth daily. 90 tablet 1     No current facility-administered medications for this visit.      All other review of systems are negative.    Objective:/62   Pulse 64   Wt 216 lb (98 kg)   SpO2 95%   BMI 29.71 kg/m    HEENT exam shows no acute change.  Neck supple without adenopathy or thyromegaly.  Lungs clear.  Heart regular rate and rhythm without murmur.  The.  No pedal edema.  He has the mild dysarthria as before which is stable.  He does ambulate slowly but independently.

## 2021-06-24 NOTE — TELEPHONE ENCOUNTER
Received fax from [facility] on patient for diabetic supplies.      Placed in Dr. Royal's inbox      03/07/19

## 2021-07-08 ENCOUNTER — VIRTUAL VISIT (OUTPATIENT)
Dept: SLEEP MEDICINE | Facility: CLINIC | Age: 76
End: 2021-07-08
Payer: MEDICARE

## 2021-07-08 DIAGNOSIS — G47.33 OBSTRUCTIVE SLEEP APNEA: Primary | ICD-10-CM

## 2021-07-08 PROCEDURE — 99442 PR PHYSICIAN TELEPHONE EVALUATION 11-20 MIN: CPT | Mod: 95 | Performed by: INTERNAL MEDICINE

## 2021-07-08 RX ORDER — MULTIVIT WITH MINERALS/LUTEIN
1 TABLET ORAL DAILY
COMMUNITY

## 2021-07-08 NOTE — PROGRESS NOTES
"Kevin Webb is a 75 year old male being evaluated via a billable telephone visit.     \"This telephone visit will be conducted via a call between you and your physician/provider. We have found that certain health care needs can be provided without the need for an in-person visit or physical exam.  This service lets us provide the care you need with a telephone conversation.  If a prescription is necessary we can send it directly to your pharmacy.  If lab work is needed we can place an order for that and you can then stop by our lab to have the test done at a later time.\"    Telephone visits are billed at different rates depending on your insurance coverage.  Please reach out to your insurance provider with any questions.    Patient has given verbal consent for  a Telephone visit? Yes    What telephone number would you like your provider to contact at at:  [phone]    How would you like to obtain your AVS? Mail a copy    Mojgan Del Angel MA    Telephone Visit Details:     Telephone Visit Start Time: 1:03 PM    Telephone Visit End Time:1:16 PM    Thank you for the opportunity to participate in the care of Kevin Webb.     He is a 75 year old y/o male patient who comes to the sleep medicine clinic for follow up.  The patient was diagnosed with CB on 11/07/2011 (AHI=23.6). He did get a new CPAP machine. However his machine is being recalled. His wife was extremely upset with the situation with the recall. He is getting benefit from CPAP.     Assessment and Plan:  In summary Kevin Webb is a 75 year old year old male who is here for follow up.    1. Obstructive sleep apnea  I congratulated the patient on his excellent CPAP usage. I directed the patient to speak with DME to proceed with the recall and possibly a replacement.     Compliance Download data for 30 Days:  Pressure setting: CPAP 10 cwp  Residual AHI:3.3 events per hour  Leak:Minimal  Compliance:96.7%  Mask Tolerance:Good  Skin " irritation:None  DME[facility]    Sleep-Wake Cycle:    The patient likes to initiate sleep at around 9:30 PM with a sleep latency of less than 10 minutes. The patient has 1 nocturnal awakenings. Final wake up time is around 7-7:30 AM.    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 Richi Dos Santos at [facility] KNEE SCOPE, DIAGNOSTIC      Description: Arthroscopy Knee Left;  Recorded: 02/18/2009;     HC KNEE SCOPE, DIAGNOSTIC      Description: Arthroscopy Knee Left;  Proc Date: 08/02/2007;     IR CEREBRAL ANGIOGRAM  11/5/2015     IR CEREBRAL ANGIOGRAM  6/20/2016     IR VENOUS STENT  12/28/2015     JOINT REPLACEMENT       KNEE SURGERY Bilateral      OTHER SURGICAL HISTORY Right 10/14/2015    [facility] [place]     PICC  11/4/2015          THROMBECTOMY         Social History     Socioeconomic History     Marital status:      Spouse name: Not on file     Number of children: Not on file     Years of education: Not on file     Highest education level: Not on file   Occupational History     Not on file   Social Needs     Financial resource strain: Not on file     Food insecurity     Worry: Not on file     Inability: Not on file     Transportation needs     Medical: Not on file     Non-medical: Not on file   Tobacco Use     Smoking status: Former  Smoker     Smokeless tobacco: Never Used   Substance and Sexual Activity     Alcohol use: Not on file     Drug use: Not on file     Sexual activity: Not on file   Lifestyle     Physical activity     Days per week: Not on file     Minutes per session: Not on file     Stress: Not on file   Relationships     Social connections     Talks on phone: Not on file     Gets together: Not on file     Attends Alevism service: Not on file     Active member of club or organization: Not on file     Attends meetings of clubs or organizations: Not on file     Relationship status: Not on file     Intimate partner violence     Fear of current or ex partner: Not on file     Emotionally abused: Not on file     Physically abused: Not on file     Forced sexual activity: Not on file   Other Topics Concern     Not on file   Social History Narrative     Not on file       Current Outpatient Medications   Medication Sig Dispense Refill     aspirin 81 MG EC tablet Take 81 mg by mouth       atorvastatin (LIPITOR) 40 MG tablet        B-D ULTRA-FINE 33 LANCETS MISC 1 strip       blood glucose (ONETOUCH ULTRA) test strip 1 each       Calcium Carbonate-Vit D-Min (CALCIUM 1200 PO) Take 1,200 mg by mouth daily       clopidogrel (PLAVIX) 75 MG tablet        Multiple Vitamins-Minerals (AIRBORNE GUMMIES PO)        multivitamin (CENTRUM SILVER) tablet Take 1 tablet by mouth daily       cyanocobalamin (VITAMIN B-12) 1000 MCG SUBL sublingual tablet Place 1,000 mcg under the tongue       sertraline (ZOLOFT) 50 MG tablet          No Known Allergies    Labs/Studies:    No results found for: OLIMPIA    I reviewed the efficacy and compliance report from his device. Data summarized on the HPI and the PAP compliance flow sheet.     Patient verbalized understanding of these issues, agrees with the plan and all questions were answered today. Patient was given an opportuntity to voice any other symptoms or concerns not listed above. Patient did not have any other  symptoms or concerns.      Luc Mace DO  Board Certified in Internal Medicine and Sleep Medicine    (Note created with Dragon voice recognition and unintended spelling errors and word substitutions may occur)     Audio and visual devices were used for this virtual clinic visit with permission from patient.

## 2021-07-08 NOTE — PATIENT INSTRUCTIONS
Your BMI is There is no height or weight on file to calculate BMI.  Weight management is a personal decision.  If you are interested in exploring weight loss strategies, the following discussion covers the approaches that may be successful. Body mass index (BMI) is one way to tell whether you are at a healthy weight, overweight, or obese. It measures your weight in relation to your height.  A BMI of 18.5 to 24.9 is in the healthy range. A person with a BMI of 25 to 29.9 is considered overweight, and someone with a BMI of 30 or greater is considered obese. More than two-thirds of American adults are considered overweight or obese.  Being overweight or obese increases the risk for further weight gain. Excess weight may lead to heart disease and diabetes.  Creating and following plans for healthy eating and physical activity may help you improve your health.  Weight control is part of healthy lifestyle and includes exercise, emotional health, and healthy eating habits. Careful eating habits lifelong are the mainstay of weight control. Though there are significant health benefits from weight loss, long-term weight loss with diet alone may be very difficult to achieve- studies show long-term success with dietary management in less than 10% of people. Attaining a healthy weight may be especially difficult to achieve in those with severe obesity. In some cases, medications, devices and surgical management might be considered.  What can you do?  If you are overweight or obese and are interested in methods for weight loss, you should discuss this with your provider.     Consider reducing daily calorie intake by 500 calories.     Keep a food journal.     Avoiding skipping meals, consider cutting portions instead.    Diet combined with exercise helps maintain muscle while optimizing fat loss. Strength training is particularly important for building and maintaining muscle mass. Exercise helps reduce stress, increase energy,  and improves fitness. Increasing exercise without diet control, however, may not burn enough calories to loose weight.       Start walking three days a week 10-20 minutes at a time    Work towards walking thirty minutes five days a week     Eventually, increase the speed of your walking for 1-2 minutes at time    In addition, we recommend that you review healthy lifestyles and methods for weight loss available through the National Institutes of Health patient information sites:  http://win.niddk.nih.gov/publications/index.htm    And look into health and wellness programs that may be available through your health insurance provider, employer, local community center, or wilfred club.    Weight management plan: Patient was referred to their PCP to discuss a diet and exercise plan.  Equipment Instructions    We will process your PAP order and send it to a Durable Medical Equipment (DME) provider.    The medical equipment company should call you within 7 days.  If you have not heard from the company, please contact them to see if they received your order and are planning to call you.    Please call us at [phone] if you are unable to contact the medical equipment company or if they do not have the order.    If you are starting a new PAP machine, please call us after you use it the first night to let us know how it went. This call also helps us know that you received your equipment and that everything is ready. Please use our central phone number [phone]    Contact information for [employer] company:    [employer] Tel: [phone]

## 2021-07-09 NOTE — NURSING NOTE
AVS sent via mailed to patient's home. 7/9/2021 8:35 AM            PALOMO Kinsey  [facility]

## 2021-08-02 ENCOUNTER — TRANSFERRED RECORDS (OUTPATIENT)
Dept: HEALTH INFORMATION MANAGEMENT | Facility: CLINIC | Age: 76
End: 2021-08-02

## 2021-08-02 LAB — RETINOPATHY: NEGATIVE

## 2021-08-20 ENCOUNTER — TELEPHONE (OUTPATIENT)
Dept: FAMILY MEDICINE | Facility: CLINIC | Age: 76
End: 2021-08-20

## 2021-08-20 NOTE — TELEPHONE ENCOUNTER
Request from [facility] to Refill the following Diabetic Supplies:     Meter (1)  Meter Battery (2 per 90 days)  Control Solution (1 per 90 days)  Lancet Device (1 per 6 months)  Lancets (100/box)  Glucose Test Strips (50/box)     Form placed in Provider's Inbox (yellow folder)     8/20/2021

## 2021-08-22 ENCOUNTER — MEDICAL CORRESPONDENCE (OUTPATIENT)
Dept: HEALTH INFORMATION MANAGEMENT | Facility: CLINIC | Age: 76
End: 2021-08-22

## 2021-08-23 NOTE — TELEPHONE ENCOUNTER
Faxed and placed in to be scanned.     Copy was made? Yes. Placed in Dr. Medley's folder.     Valeria Jolly CMA

## 2021-09-04 ENCOUNTER — HEALTH MAINTENANCE LETTER (OUTPATIENT)
Age: 76
End: 2021-09-04

## 2021-10-18 ENCOUNTER — OFFICE VISIT (OUTPATIENT)
Dept: FAMILY MEDICINE | Facility: CLINIC | Age: 76
End: 2021-10-18
Payer: MEDICARE

## 2021-10-18 VITALS
SYSTOLIC BLOOD PRESSURE: 112 MMHG | OXYGEN SATURATION: 96 % | WEIGHT: 216.4 LBS | HEART RATE: 60 BPM | DIASTOLIC BLOOD PRESSURE: 54 MMHG | BODY MASS INDEX: 30.18 KG/M2

## 2021-10-18 DIAGNOSIS — E78.00 PURE HYPERCHOLESTEROLEMIA: ICD-10-CM

## 2021-10-18 DIAGNOSIS — Z86.0101 HX OF ADENOMATOUS POLYP OF COLON: ICD-10-CM

## 2021-10-18 DIAGNOSIS — G47.33 OBSTRUCTIVE SLEEP APNEA: ICD-10-CM

## 2021-10-18 DIAGNOSIS — E53.8 VITAMIN B12 DEFICIENCY (NON ANEMIC): ICD-10-CM

## 2021-10-18 DIAGNOSIS — E11.9 TYPE 2 DIABETES MELLITUS WITHOUT COMPLICATION, WITHOUT LONG-TERM CURRENT USE OF INSULIN (H): ICD-10-CM

## 2021-10-18 DIAGNOSIS — Z00.00 HEALTH CARE MAINTENANCE: ICD-10-CM

## 2021-10-18 DIAGNOSIS — I63.431 CEREBRAL INFARCTION DUE TO EMBOLISM OF RIGHT POSTERIOR CEREBRAL ARTERY (H): Primary | ICD-10-CM

## 2021-10-18 LAB
CREAT UR-MCNC: 233 MG/DL
HBA1C MFR BLD: 6.3 % (ref 0–5.6)
MICROALBUMIN UR-MCNC: 0.73 MG/DL (ref 0–1.99)
MICROALBUMIN/CREAT UR: 3.1 MG/G CR
VIT B12 SERPL-MCNC: 506 PG/ML (ref 213–816)

## 2021-10-18 PROCEDURE — 36415 COLL VENOUS BLD VENIPUNCTURE: CPT | Performed by: FAMILY MEDICINE

## 2021-10-18 PROCEDURE — 99214 OFFICE O/P EST MOD 30 MIN: CPT | Performed by: FAMILY MEDICINE

## 2021-10-18 PROCEDURE — 82043 UR ALBUMIN QUANTITATIVE: CPT | Performed by: FAMILY MEDICINE

## 2021-10-18 PROCEDURE — 83036 HEMOGLOBIN GLYCOSYLATED A1C: CPT | Performed by: FAMILY MEDICINE

## 2021-10-18 PROCEDURE — 82607 VITAMIN B-12: CPT | Performed by: FAMILY MEDICINE

## 2021-10-18 RX ORDER — ATORVASTATIN CALCIUM 40 MG/1
40 TABLET, FILM COATED ORAL AT BEDTIME
Qty: 90 TABLET | Refills: 3 | Status: SHIPPED | OUTPATIENT
Start: 2021-10-18 | End: 2022-09-06

## 2021-10-18 RX ORDER — CLOPIDOGREL BISULFATE 75 MG/1
75 TABLET ORAL DAILY
Qty: 90 TABLET | Refills: 3 | Status: SHIPPED | OUTPATIENT
Start: 2021-10-18 | End: 2022-10-24

## 2021-10-19 PROBLEM — Z86.0101 HX OF ADENOMATOUS POLYP OF COLON: Status: ACTIVE | Noted: 2021-10-19

## 2021-10-19 NOTE — PROGRESS NOTES
ASSESSMENT/PLAN:       1. 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  I do not see any reason why he cannot continue to drive locally.  His neurologic deficit is stable and compensated    - clopidogrel (PLAVIX) 75 MG tablet; Take 1 tablet (75 mg) by mouth daily  Dispense: 90 tablet; Refill: 3    2. Type 2 diabetes mellitus without complication, without long-term current use of insulin (H)     - Albumin Random Urine Quantitative with Creat Ratio, the microalbumin/creatinine ratio is 3.1 which is normal  Thus no evidence for diabetic nephropathy    - HEMOGLOBIN A1C, 6.3 which is excellent and will continue with diet and lifestyle changes for his diabetes    3. Pure hypercholesterolemia  LDL has been at goal earlier this year    - atorvastatin (LIPITOR) 40 MG tablet; Take 1 tablet (40 mg) by mouth At Bedtime  Dispense: 90 tablet; Refill: 3    4. Vitamin B12 deficiency (non anemic)     - Vitamin B12, 506 and this is with him not taking a vitamin B12 tablet and I think it is fine for him to not continue that    5. Obstructive sleep apnea  Continue with the use of CPAP    6. Health care maintenance     - REVIEW OF HEALTH MAINTENANCE PROTOCOL ORDERS  Consider shingles vaccine as well as tetanus booster through the local pharmacy  Patient desires to get his COVID-19 booster prior to those vaccinations and awaiting the approval of Moderna for his booster.  Future consideration for colonoscopy 2024      Health Maintenance Due   Topic     Diptheria Tetanus Pertussis (DTAP/TDAP/TD) Vaccine (1 - Tdap)       Level of medical decision making moderate  [facility]  Follow-up May 2022 Medicare annual wellness visit  Number of problems addressed included 2 or more chronic stable conditions  Amount of data reviewed included 3 unique tests that were ordered, reviewed and managed  Risk of complications  moderate requiring prescription drug management  Bird Medley MD      PROGRESS NOTE   10/19/2021    SUBJECTIVE:  [id number]  who presents for   Chief Complaint   Patient presents with     Establish Care     Formerly Dr. Pelaez.      Forms     Needs statement and a signature for DMV     The patient is seen today to establish care.  Had been a patient of Dr. Pelaez's.  Seen today with his wife.  They will be going for the winter to [place] next week.    The patient sustained a cerebral infarction from a emboli in the right posterior cerebral artery in November 2015.  The patient has had some residual problems with balance, speech and function of the left upper extremity.  He has resumed going to the [facility] to do some pool aerobics and also will lift some weights and walk on the track.  He has found those exercises to be helpful.  The [place] Department [employer] Public [employer] has a loss of consciousness or voluntary control form to fill out.  This likely is because of his history of a cerebrovascular accident however he never did have loss of consciousness with that.  He has been driving but typically just within a few miles of his home.  He is not had any driving incidents or violations.    The patient has type 2 diabetes and is not on any medication.  His last hemoglobin A1c was 6.7 in April 2021.  He checks his blood sugar once a week and if it is checked fasting in the morning is typically about 115 and if he checks it before bedtime it is about 130 in general.    Hyperlipidemia is treated with atorvastatin 40 mg daily and his last LDL was 54 in April 2021.    He has a diagnosis of vitamin B12 deficiency however he is not on any vitamin B12 supplement and not sure if he needs to take any supplement.    History of obstructive sleep apnea treated with CPAP since 2015    Patient has had a history of colon polyps with the last colonoscopy in May 2021 where he had 3 polyps and it was recommended that he have a 3-year  follow-up if his health status is good enough to have another colonoscopy.    Patient Active Problem List   Diagnosis     Essential Hypercholesterolemia     Obstructive sleep apnea     Cerebral infarction due to embolism of posterior cerebral artery (H)     Vertebral artery stenosis, left     Pseudobulbar affect     Dysarthria     Type 2 diabetes mellitus without complication, without long-term current use of insulin (H)       Current Outpatient Medications   Medication Sig Dispense Refill     aspirin 81 MG EC tablet Take 81 mg by mouth       atorvastatin (LIPITOR) 40 MG tablet Take 1 tablet (40 mg) by mouth At Bedtime 90 tablet 3     B-D ULTRA-FINE 33 LANCETS MISC 1 strip       blood glucose (ONETOUCH ULTRA) test strip 1 each       Calcium Carbonate-Vit D-Min (CALCIUM 1200 PO) Take 1,200 mg by mouth daily       clopidogrel (PLAVIX) 75 MG tablet Take 1 tablet (75 mg) by mouth daily 90 tablet 3     Multiple Vitamins-Minerals (AIRBORNE GUMMIES PO)        multivitamin (CENTRUM SILVER) tablet Take 1 tablet by mouth daily         History   Smoking Status     Former Smoker   Smokeless Tobacco     Never Used           OBJECTIVE:      Recent Results (from the past 48 hour(s))   Vitamin B12    Collection Time: 10/18/21  8:18 AM   Result Value Ref Range    Vitamin B12 506 213 - 816 pg/mL   Albumin Random Urine Quantitative with Creat Ratio    Collection Time: 10/18/21  8:21 AM   Result Value Ref Range    Microalbumin Urine mg/dL 0.73 0.00 - 1.99 mg/dL    Creatinine Urine mg/dL 233 mg/dL    Microalbumin Urine mg/g Cr 3.1 <=19.9 mg/g Cr   HEMOGLOBIN A1C    Collection Time: 10/18/21  8:27 AM   Result Value Ref Range    Hemoglobin A1C 6.3 (H) 0.0 - 5.6 %       Vitals:    10/18/21 0720   BP: 112/54   BP Location: Left arm   Patient Position: Sitting   Cuff Size: Adult Regular   Pulse: 60   SpO2: 96%   Weight: 98.2 kg (216 lb 6.4 oz)     Wt Readings from Last 3 Encounters:   10/18/21 98.2 kg (216 lb 6.4 oz)   04/15/21 98.9 kg (218  lb)   07/21/20 91.3 kg (201 lb 5 oz)           Physical Exam:  GENERAL APPEARANCE: Very pleasant 75-year-old male here with his wife.  The patient has a slight slurring of his speech but is easily understood., NAD, well hydrated, well nourished  SKIN:  Normal skin turgor, no lesions/rashes   HEENT: moist mucous membranes, no rhinorrhea  NECK: Normal without adenopathy or masses, no carotid bruits  CV: RRR, no M/G/R   LUNGS: CTAB  ABDOMEN: S&NT, no masses or enlarged organs, no abdominal bruits  EXTREMITY: no edema, pedal pulses are barely palpable bilaterally but capillary refill on his toes is less than 3 seconds bilaterally, essentially all of his toenails are involved with fungal infection thick curved and full ROM of all joints  NEURO: no focal findings, monofilament 5.07 demonstrates sensation that is intact on the dorsal and plantar surface of both feet.  I do note that the patient has some loss of full function of the left upper extremity compared to the right.

## 2021-10-19 NOTE — RESULT ENCOUNTER NOTE
Kevin,  It was very nice to have the opportunity to meet you in the clinic this week.  Your test results are back and I would like to review those results.Your hemoglobin A1c is excellent at 6.3.  Our goal is to see that below 7.  Continue with your exercise plan along with diet modification and keeping your weight down.  No new medication is needed for your diabetes.    The urine test shows that the ratio of microalbumin/creatinine is 3.1 which is in the normal range.  This means that there is no evidence of your diabetes causing problems with your kidneys.    I checked a vitamin B12 level which is normal at 506.  This is without you taking a vitamin B12 supplement and it is fine for you to not take a vitamin B12 supplement at this time.    I hope you have a good trip to [place] and a wonderful winter. I will plan to see you back in May for a Medicare annual wellness visit.    Best regards,Dr. Medley

## 2022-02-17 ENCOUNTER — TELEPHONE (OUTPATIENT)
Dept: FAMILY MEDICINE | Facility: CLINIC | Age: 77
End: 2022-02-17
Payer: MEDICARE

## 2022-02-17 NOTE — TELEPHONE ENCOUNTER
Form faxed to [facility] at [phone]. Copy left in provider's grey bin and original sent to scan.     Fanny Schmid CMA.

## 2022-02-17 NOTE — TELEPHONE ENCOUNTER
Form was reviewed, filled out, signed and given to Fanny urena assistant for faxing.    Dr. Medley

## 2022-02-17 NOTE — TELEPHONE ENCOUNTER
Forms Request  Name of form/paperwork:    Diabetic Supplies Order form 6 Month Authorization  Have you been seen for this request: N/A  Do we have the form: Yes - placed in Provider's Inbox (yellow folder)  When is form needed by: when done  How would you like the form returned:   Fax: [phone]  Patient Notified form requests are processed in 3-5 business days: N/A  Okay to leave a detailed message? N/A

## 2022-04-11 ENCOUNTER — TELEPHONE (OUTPATIENT)
Dept: SLEEP MEDICINE | Facility: CLINIC | Age: 77
End: 2022-04-11
Payer: MEDICARE

## 2022-04-12 ENCOUNTER — TELEPHONE (OUTPATIENT)
Dept: SLEEP MEDICINE | Facility: CLINIC | Age: 77
End: 2022-04-12
Payer: MEDICARE

## 2022-04-12 NOTE — TELEPHONE ENCOUNTER
Reason for Call:  Other prescription    Detailed comments: patient needs the rx sent to where they live now so they can get supplies. Can we have them sent to her house in May in [place].    Phone Number Patient can be reached at: Work number on file:  [phone] (work)    Best Time: anytime    Can we leave a detailed message on this number? YES    Call taken on 4/12/2022 at 1:30 PM by Susan Song

## 2022-04-13 NOTE — TELEPHONE ENCOUNTER
[facility] FA[facility]:  [phone]. copy to HIM for scan into [facility].

## 2022-04-13 NOTE — TELEPHONE ENCOUNTER
Message left informing pt he would need to contact [facility] regarding where and when he would like cpap order sent.      PALOMO Hutton

## 2022-04-13 NOTE — TELEPHONE ENCOUNTER
Cpap Orders have been received from [facility] and given to DOD to review and sign.    PALOMO Hutton

## 2022-05-16 ENCOUNTER — OFFICE VISIT (OUTPATIENT)
Dept: FAMILY MEDICINE | Facility: CLINIC | Age: 77
End: 2022-05-16
Payer: MEDICARE

## 2022-05-16 VITALS
RESPIRATION RATE: 20 BRPM | WEIGHT: 221 LBS | HEART RATE: 68 BPM | HEIGHT: 71 IN | SYSTOLIC BLOOD PRESSURE: 130 MMHG | TEMPERATURE: 98.8 F | OXYGEN SATURATION: 97 % | DIASTOLIC BLOOD PRESSURE: 68 MMHG | BODY MASS INDEX: 30.94 KG/M2

## 2022-05-16 DIAGNOSIS — E78.00 PURE HYPERCHOLESTEROLEMIA: ICD-10-CM

## 2022-05-16 DIAGNOSIS — G47.33 OBSTRUCTIVE SLEEP APNEA: ICD-10-CM

## 2022-05-16 DIAGNOSIS — Z11.59 ENCOUNTER FOR HEPATITIS C SCREENING TEST FOR LOW RISK PATIENT: ICD-10-CM

## 2022-05-16 DIAGNOSIS — D64.9 ANEMIA, UNSPECIFIED TYPE: ICD-10-CM

## 2022-05-16 DIAGNOSIS — H00.015 HORDEOLUM EXTERNUM LEFT LOWER EYELID: ICD-10-CM

## 2022-05-16 DIAGNOSIS — Z86.0101 HX OF ADENOMATOUS POLYP OF COLON: ICD-10-CM

## 2022-05-16 DIAGNOSIS — I63.431 CEREBRAL INFARCTION DUE TO EMBOLISM OF RIGHT POSTERIOR CEREBRAL ARTERY (H): Primary | ICD-10-CM

## 2022-05-16 DIAGNOSIS — E11.9 TYPE 2 DIABETES MELLITUS WITHOUT COMPLICATION, WITHOUT LONG-TERM CURRENT USE OF INSULIN (H): ICD-10-CM

## 2022-05-16 LAB
ALBUMIN SERPL-MCNC: 3.8 G/DL (ref 3.5–5)
ALP SERPL-CCNC: 81 U/L (ref 45–120)
ALT SERPL W P-5'-P-CCNC: 29 U/L (ref 0–45)
ANION GAP SERPL CALCULATED.3IONS-SCNC: 13 MMOL/L (ref 5–18)
AST SERPL W P-5'-P-CCNC: 21 U/L (ref 0–40)
BILIRUB SERPL-MCNC: 0.7 MG/DL (ref 0–1)
BUN SERPL-MCNC: 12 MG/DL (ref 8–28)
CALCIUM SERPL-MCNC: 8.6 MG/DL (ref 8.5–10.5)
CHLORIDE BLD-SCNC: 110 MMOL/L (ref 98–107)
CHOLEST SERPL-MCNC: 88 MG/DL
CO2 SERPL-SCNC: 20 MMOL/L (ref 22–31)
CREAT SERPL-MCNC: 0.96 MG/DL (ref 0.7–1.3)
FASTING STATUS PATIENT QL REPORTED: YES
GFR SERPL CREATININE-BSD FRML MDRD: 81 ML/MIN/1.73M2
GLUCOSE BLD-MCNC: 119 MG/DL (ref 70–125)
HBA1C MFR BLD: 6.5 % (ref 0–5.6)
HCV AB SERPL QL IA: NEGATIVE
HDLC SERPL-MCNC: 30 MG/DL
HGB BLD-MCNC: 12.8 G/DL (ref 13.3–17.7)
LDLC SERPL CALC-MCNC: 42 MG/DL
POTASSIUM BLD-SCNC: 4.3 MMOL/L (ref 3.5–5)
PROT SERPL-MCNC: 6.8 G/DL (ref 6–8)
SODIUM SERPL-SCNC: 143 MMOL/L (ref 136–145)
TRIGL SERPL-MCNC: 80 MG/DL

## 2022-05-16 PROCEDURE — 86803 HEPATITIS C AB TEST: CPT | Performed by: FAMILY MEDICINE

## 2022-05-16 PROCEDURE — 80053 COMPREHEN METABOLIC PANEL: CPT | Performed by: FAMILY MEDICINE

## 2022-05-16 PROCEDURE — 36415 COLL VENOUS BLD VENIPUNCTURE: CPT | Performed by: FAMILY MEDICINE

## 2022-05-16 PROCEDURE — 85018 HEMOGLOBIN: CPT | Performed by: FAMILY MEDICINE

## 2022-05-16 PROCEDURE — 99214 OFFICE O/P EST MOD 30 MIN: CPT | Performed by: FAMILY MEDICINE

## 2022-05-16 PROCEDURE — 83036 HEMOGLOBIN GLYCOSYLATED A1C: CPT | Performed by: FAMILY MEDICINE

## 2022-05-16 PROCEDURE — 80061 LIPID PANEL: CPT | Performed by: FAMILY MEDICINE

## 2022-05-16 ASSESSMENT — PAIN SCALES - GENERAL: PAINLEVEL: NO PAIN (0)

## 2022-05-16 NOTE — PROGRESS NOTES
ASSESSMENT/PLAN:       1. 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 Type 2 diabetes mellitus without complication, without long-term current use of insulin (H)    - Comprehensive metabolic panel, blood glucose is 119    - Hemoglobin A1c, 6.5    We will continue to monitor every 6 months and continue with lifestyle and diet control of his diabetes.  You note that there is a slow rise in his A1c and if he approaches an A1c of 7 certainly would start oral therapy for his diabetes  Encouraged him to keep his eye appointment this summer    3. Hx of adenomatous polyp of colon  Recommendation would be to follow-up with a colonoscopy in 2024.  Explained to him that at age 78 depending on his health it may be very reasonable to not do another colonoscopy.    4. Pure hypercholesterolemia    - Lipid panel reflex to direct LDL Fasting, 88/80/30/42  The patient does not seem to be having any complications or side effects related to his statin therapy  Will continue with atorvastatin 40 mg daily    5. Obstructive sleep apnea  The patient will continue with nightly use of CPAP    6. Anemia, unspecified type    - Hemoglobin, 12.8 which is down and will need to monitor this and my recommendation would be for him to have a CBC with white blood cells and platelets checked with his next blood draw      7. Hordeolum externum left lower eyelid  The nodule on his left lower eyelid could be a basal cell carcinoma versus a hordeolum.  It is quite firm but nontender and I encouraged him to seek care at dermatology as scheduled this summer.  Encouraged him to use some warm moist packs.  If he feels like it is getting larger he should be seen by his eye doctor and dermatology as soon as possible and if he needs help with that should let us know.    8. Encounter for hepatitis C screening test for low risk patient    - Hepatitis  C antibody, negative        Health Maintenance Due   Topic     Diptheria Tetanus Pertussis (DTAP/TDAP/TD) Vaccine (1 - Tdap)     Tdap this fall at Pharmacy  Has Eye appointment this summer and Derm appointment ASAP    Follow-up 6 months Medicare annual wellness visit  Test results by Alisson Medley MD      PROGRESS NOTE   5/16/2022    SUBJECTIVE:  [id number]  who presents for   Chief Complaint   Patient presents with     Medication Update     Fasting  Red mass on bottom lid of left eye      The patient is seen today for a 6-month follow-up visit.  The patient had been taking care of by Dr. Pelaez and I have seen him 1 prior time which was in October 2021.    The patient has had a history of a cerebral infarction thought to be related to an embolus in 2015 that affected the right posterior cerebral artery resulting in some left upper extremity weakness difficulty with speech and balance problems.  What his not improve the most has been his balance.  He does not had any recent falls.  He is taking aspirin 81 mg daily  Plavix 75 mg daily  He has not had any bleeding complications other than easy bruising.    Has type 2 diabetes which is diet controlled and his last A1c was 6.3.  He does check his blood sugar occasionally and its been running at 130 or less.  He gets his eye exam through [facility] and will be due for that this summer he has not had any nephropathy, retinopathy or neuropathy.    History of adenomatous colon polyps with last colonoscopy May 2021 with a recommendation for 3-year follow-up.  He did have 3 polyps.  None of the polyps were advanced polyps.    History of hyperlipidemia on atorvastatin 40 mg daily and lipids have been well controlled with his last LDL at 54.  He has not had any side effects from atorvastatin.    Obstructive sleep apnea using CPAP regularly with good benefit    Over the last 2 years has had mild anemia with his last hemoglobin of 13.9.  The patient has not  noticed any hematochezia or melena    For about 2 months he has noticed a lump on his left lower eyelid that is asymptomatic.  He may be a bit larger now.  The patient does have a history of nonmelanoma skin cancer and is seen by dermatology on a yearly basis and will soon have a dermatology follow-up.      Does not qualify for lung cancer screening as he only has a 10-pack-year history of smoking and quit smoking when he was 31 years of age    Exercise consists of doing some push-ups sit ups leg raises and some walking.    Spends the winter months in [place]           Patient Active Problem List   Diagnosis     Essential Hypercholesterolemia     Obstructive sleep apnea     Cerebral infarction due to embolism of posterior cerebral artery (H)     Vertebral artery stenosis, left     Pseudobulbar affect     Dysarthria     Type 2 diabetes mellitus without complication, without long-term current use of insulin (H)     Hx of adenomatous polyp of colon       Current Outpatient Medications   Medication Sig Dispense Refill     aspirin 81 MG EC tablet Take 81 mg by mouth       atorvastatin (LIPITOR) 40 MG tablet Take 1 tablet (40 mg) by mouth At Bedtime 90 tablet 3     B-D ULTRA-FINE 33 LANCETS MISC 1 strip       blood glucose (ONETOUCH ULTRA) test strip 1 each       Calcium Carbonate-Vit D-Min (CALCIUM 1200 PO) Take 1,200 mg by mouth daily       clopidogrel (PLAVIX) 75 MG tablet Take 1 tablet (75 mg) by mouth daily 90 tablet 3     Multiple Vitamins-Minerals (AIRBORNE GUMMIES PO)        multivitamin (CENTRUM SILVER) tablet Take 1 tablet by mouth daily         History   Smoking Status     Former Smoker   Smokeless Tobacco     Never Used     ROS:  Answers for HPI/ROS submitted by the patient on 5/16/2022  Frequency of checking blood sugars:: a few times a month  What time of day are you checking your blood sugars : before meals  Have you had any blood sugars above 200?: No  Have you had any blood sugars below 70?:  No  Hypoglycemia symptoms:: none  Diabetic concerns:: none  Paraesthesia present:: none of these symptoms  How many servings of fruits and vegetables do you eat daily?: 2-3  On average, how many sweetened beverages do you drink each day (Examples: soda, juice, sweet tea, etc.  Do NOT count diet or artificially sweetened beverages)?: 0  How many minutes a day do you exercise enough to make your heart beat faster?: 30 to 60  How many days a week do you exercise enough to make your heart beat faster?: 7  How many days per week do you miss taking your medication?: 0          OBJECTIVE:      Recent Results (from the past 48 hour(s))   Hepatitis C antibody    Collection Time: 05/16/22  7:26 AM   Result Value Ref Range    Hepatitis C Antibody Negative Negative   Lipid panel reflex to direct LDL Fasting    Collection Time: 05/16/22  7:26 AM   Result Value Ref Range    Cholesterol 88 <=199 mg/dL    Triglycerides 80 <=149 mg/dL    Direct Measure HDL 30 (L) >=40 mg/dL    LDL Cholesterol Calculated 42 <=129 mg/dL    Patient Fasting > 8hrs? Yes    Comprehensive metabolic panel    Collection Time: 05/16/22  7:26 AM   Result Value Ref Range    Sodium 143 136 - 145 mmol/L    Potassium 4.3 3.5 - 5.0 mmol/L    Chloride 110 (H) 98 - 107 mmol/L    Carbon Dioxide (CO2) 20 (L) 22 - 31 mmol/L    Anion Gap 13 5 - 18 mmol/L    Urea Nitrogen 12 8 - 28 mg/dL    Creatinine 0.96 0.70 - 1.30 mg/dL    Calcium 8.6 8.5 - 10.5 mg/dL    Glucose 119 70 - 125 mg/dL    Alkaline Phosphatase 81 45 - 120 U/L    AST 21 0 - 40 U/L    ALT 29 0 - 45 U/L    Protein Total 6.8 6.0 - 8.0 g/dL    Albumin 3.8 3.5 - 5.0 g/dL    Bilirubin Total 0.7 0.0 - 1.0 mg/dL    GFR Estimate 81 >60 mL/min/1.73m2   Hemoglobin A1c    Collection Time: 05/16/22  7:26 AM   Result Value Ref Range    Hemoglobin A1C 6.5 (H) 0.0 - 5.6 %   Hemoglobin    Collection Time: 05/16/22  7:26 AM   Result Value Ref Range    Hemoglobin 12.8 (L) 13.3 - 17.7 g/dL       Vitals:    05/16/22 0652   BP:  "130/68   Pulse: 68   Resp: 20   Temp: 98.8  F (37.1  C)   TempSrc: Oral   SpO2: 97%   Weight: 100.2 kg (221 lb)   Height: 1.803 m (5' 11\")     Wt Readings from Last 3 Encounters:   05/16/22 100.2 kg (221 lb)   10/18/21 98.2 kg (216 lb 6.4 oz)   04/15/21 98.9 kg (218 lb)           Physical Exam:  GENERAL APPEARANCE: 76-year-old male very pleasant who has some difficulty with speech that is mild and some hearing difficulty, NAD, well hydrated, well nourished  SKIN:  Normal skin turgor, no lesions/rashes   HEENT: moist mucous membranes, no rhinorrhea, left lower lid along the lash line there is a relatively firm nodule that he is 3 mm in diameter with a small amount of crusting of the lashes but no tenderness and on the palpebral side of the lid the conjunctiva is mildly red.  NECK: Normal without adenopathy or masses, no carotid bruits  CV: RRR, no M/G/R   LUNGS: CTAB without rales rhonchi or wheezes  ABDOMEN: S&NT, no masses or enlarged organs no abdominal bruits  EXTREMITY: no edema and full ROM of all joints  NEURO: no focal findings    "

## 2022-05-17 PROBLEM — D64.9 ANEMIA, UNSPECIFIED TYPE: Status: ACTIVE | Noted: 2022-05-17

## 2022-05-17 NOTE — RESULT ENCOUNTER NOTE
Kevin,    It was nice to see you in the clinic yesterday.  Your test results are back and I would like to review those results.    The metabolic panel shows that your kidney function is normal with a GFR of 81.  Your liver function tests are normal.  The blood glucose was 119 which is similar to what you are getting at home.  The electrolytes were normal except for a slightly high chloride and low carbon dioxide which are not of concern.    Your lipids look excellent with the bad cholesterol which is the LDL at 42 which is very good.  Your HDL cholesterol is the good cholesterol and 30 is on the low side.  Try to do a bit more walking in a safe fashion which will improve the HDL cholesterol.    Screening for hepatitis C was negative    Hemoglobin is slightly low at 12.8 and that will need to be checked again in 6 months.    Your hemoglobin A1c was 6.5 which is up slightly.  I feel that your diabetes is still controlled well enough with diet and lifestyle changes but will need to be monitored every 6 months and there may be a time in the future when medication will be needed for your diabetes.    If the nodule on your left lower eyelid is not getting better or gone in about a month I would make an appointment with your eye doctor and dermatology to get seen as soon as possible.  This nodule certainly could be a stye but there always is a possibility that it could be a early skin cancer.  If skin cancer it will not go away and the Eye doctor and Dermatology need to see you as soon as possible.    Unfortunately I have only been able to see you 2 times and I am retiring this summer.  Your follow-up in 6 months will need to be with a different provider and I would suggest either seeing Tito Alva or Francis Wren.    Please let me know if you have other questions or if I can help in any other way.  I will be available through the month of June if help is needed.    Dr. Medley

## 2022-06-11 ENCOUNTER — HEALTH MAINTENANCE LETTER (OUTPATIENT)
Age: 77
End: 2022-06-11

## 2022-07-05 ENCOUNTER — OFFICE VISIT (OUTPATIENT)
Dept: FAMILY MEDICINE | Facility: CLINIC | Age: 77
End: 2022-07-05
Payer: MEDICARE

## 2022-07-05 VITALS
WEIGHT: 216 LBS | TEMPERATURE: 98 F | DIASTOLIC BLOOD PRESSURE: 64 MMHG | OXYGEN SATURATION: 97 % | HEART RATE: 64 BPM | SYSTOLIC BLOOD PRESSURE: 114 MMHG | RESPIRATION RATE: 16 BRPM | HEIGHT: 70 IN | BODY MASS INDEX: 30.92 KG/M2

## 2022-07-05 DIAGNOSIS — Z01.818 PREOP GENERAL PHYSICAL EXAM: Primary | ICD-10-CM

## 2022-07-05 DIAGNOSIS — C44.310 BCC (BASAL CELL CARCINOMA), FACE: ICD-10-CM

## 2022-07-05 DIAGNOSIS — E11.9 TYPE 2 DIABETES MELLITUS WITHOUT COMPLICATION, WITHOUT LONG-TERM CURRENT USE OF INSULIN (H): ICD-10-CM

## 2022-07-05 DIAGNOSIS — I63.431 CEREBRAL INFARCTION DUE TO EMBOLISM OF RIGHT POSTERIOR CEREBRAL ARTERY (H): ICD-10-CM

## 2022-07-05 DIAGNOSIS — E78.00 PURE HYPERCHOLESTEROLEMIA: ICD-10-CM

## 2022-07-05 DIAGNOSIS — G47.33 OBSTRUCTIVE SLEEP APNEA: ICD-10-CM

## 2022-07-05 LAB
ANION GAP SERPL CALCULATED.3IONS-SCNC: 11 MMOL/L (ref 7–15)
BUN SERPL-MCNC: 15.7 MG/DL (ref 8–23)
CALCIUM SERPL-MCNC: 9.5 MG/DL (ref 8.8–10.2)
CHLORIDE SERPL-SCNC: 106 MMOL/L (ref 98–107)
CREAT SERPL-MCNC: 1 MG/DL (ref 0.67–1.17)
DEPRECATED HCO3 PLAS-SCNC: 26 MMOL/L (ref 22–29)
ERYTHROCYTE [DISTWIDTH] IN BLOOD BY AUTOMATED COUNT: 12.9 % (ref 10–15)
GFR SERPL CREATININE-BSD FRML MDRD: 78 ML/MIN/1.73M2
GLUCOSE SERPL-MCNC: 98 MG/DL (ref 70–99)
HCT VFR BLD AUTO: 43.7 % (ref 40–53)
HGB BLD-MCNC: 13.7 G/DL (ref 13.3–17.7)
MCH RBC QN AUTO: 27.5 PG (ref 26.5–33)
MCHC RBC AUTO-ENTMCNC: 31.4 G/DL (ref 31.5–36.5)
MCV RBC AUTO: 88 FL (ref 78–100)
PLATELET # BLD AUTO: 225 10E3/UL (ref 150–450)
POTASSIUM SERPL-SCNC: 4.4 MMOL/L (ref 3.4–5.3)
RBC # BLD AUTO: 4.99 10E6/UL (ref 4.4–5.9)
SODIUM SERPL-SCNC: 143 MMOL/L (ref 136–145)
WBC # BLD AUTO: 6.5 10E3/UL (ref 4–11)

## 2022-07-05 PROCEDURE — 85027 COMPLETE CBC AUTOMATED: CPT | Performed by: FAMILY MEDICINE

## 2022-07-05 PROCEDURE — 99215 OFFICE O/P EST HI 40 MIN: CPT | Performed by: FAMILY MEDICINE

## 2022-07-05 PROCEDURE — 80048 BASIC METABOLIC PNL TOTAL CA: CPT | Performed by: FAMILY MEDICINE

## 2022-07-05 PROCEDURE — 36415 COLL VENOUS BLD VENIPUNCTURE: CPT | Performed by: FAMILY MEDICINE

## 2022-07-05 ASSESSMENT — ENCOUNTER SYMPTOMS
WOUND: 1
APNEA: 1
EYES NEGATIVE: 1
PSYCHIATRIC NEGATIVE: 1
CONSTITUTIONAL NEGATIVE: 1
HEMATOLOGIC/LYMPHATIC NEGATIVE: 1
MUSCULOSKELETAL NEGATIVE: 1
ENDOCRINE NEGATIVE: 1
CARDIOVASCULAR NEGATIVE: 1
GASTROINTESTINAL NEGATIVE: 1
NEUROLOGICAL NEGATIVE: 1

## 2022-07-05 ASSESSMENT — PAIN SCALES - GENERAL: PAINLEVEL: NO PAIN (0)

## 2022-07-05 NOTE — PROGRESS NOTES
[facility]  [address]  [place]  [facility] [address]  Phone: [phone]  Fax: [phone]  Primary Provider: Scott Pealez  Pre-op Performing Provider: ANSHU MENDEZ      PREOPERATIVE EVALUATION:  Today's date: 7/5/2022    Kevin Webb is a 76 year old male who presents for a preoperative evaluation.    Surgical Information:  Surgery/Procedure: Mohs surgery for basal cell followed by left lower eye lid reconstruction  Surgery Location: [facility]  Surgeon: Dr. Chávez  Surgery Date: 7/18/2022  Time of Surgery: TBD  Where patient plans to recover: At home with family  Fax number for surgical facility: [phone]    Type of Anesthesia Anticipated: General    Preop general physical exam  Patient is cleared for surgery without further evaluation required.  Patient should hold his baby aspirin for 10 days and his Plavix for 5 days.  Patient will be tested for COVID at the site prior to surgery.  - CBC with platelets  - Basic metabolic panel  - CBC with platelets  - Basic metabolic panel    BCC (basal cell carcinoma), face  Needs Mohs surgery performed.    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  Stable since.  Usually on Plavix and baby aspirin.    Type 2 diabetes mellitus without complication, without long-term current use of insulin (H)  Mild diabetic, diet controlled with last A1c 6.5%.    Essential Hypercholesterolemia  Controlled on Lipitor 40 mg with LDL of 42.    Obstructive sleep apnea  Patient is faithful with his CPAP use.    Patient should follow-up after surgery if needed, and in 6 months for his annual wellness visit.    Subjective     HPI related to upcoming procedure: Patient was found to have a small basal cell growing on his left lower lid.  He is going to have Mohs surgery to remove it and then afterwards be transferred for  reconstruction with the plastic surgeon.    Preop Questions 7/5/2022   1. Have you ever had a heart attack or stroke? YES - 2015 stroke   2. Have you ever had surgery on your heart or blood vessels, such as a stent placement, a coronary artery bypass, or surgery on an artery in your head, neck, heart, or legs? YES - left heart   3. Do you have chest pain with activity? No   4. Do you have a history of  heart failure? No   5. Do you currently have a cold, bronchitis or symptoms of other infection? No   6. Do you have a cough, shortness of breath, or wheezing? No   7. Do you or anyone in your family have previous history of blood clots? UNKNOWN -    8. Do you or does anyone in your family have a serious bleeding problem such as prolonged bleeding following surgeries or cuts? UNKNOWN -    9. Have you ever had problems with anemia or been told to take iron pills? No   10. Have you had any abnormal blood loss such as black, tarry or bloody stools? No   11. Have you ever had a blood transfusion? UNKNOWN - likely not   12. Are you willing to have a blood transfusion if it is medically needed before, during, or after your surgery? Yes   13. Have you or any of your relatives ever had problems with anesthesia? No   14. Do you have sleep apnea, excessive snoring or daytime drowsiness? YES - CB--uses CPAP   14a. Do you have a CPAP machine? Yes   15. Do you have any artifical heart valves or other implanted medical devices like a pacemaker, defibrillator, or continuous glucose monitor? No   16. Do you have artificial joints? YES - left knee   17. Are you allergic to latex? No       Health Care Directive:  Patient does not have a Health Care Directive or Living Will: Advance Directive received and scanned. Click on Code in the patient header to view.    Preoperative Review of :   reviewed - no record of controlled substances prescribed.      Status of Chronic Conditions:  DIABETES - Patient has a longstanding history of  DiabetesType Type II . Patient is being treated with diet and denies significant side effects. Control has been good. Complicating factors include but are not limited to: hyperlipidemia and CVA.     HYPERLIPIDEMIA - Patient has a long history of significant Hyperlipidemia requiring medication for treatment with recent good control. Patient reports no problems or side effects with the medication.     SLEEP PROBLEM - Patient has a longstanding history of CB treated with CPAP.. Patient has tried OTC medications with limited success.       Review of Systems   Constitutional: Negative.    HENT: Negative.    Eyes: Negative.    Respiratory: Positive for apnea.    Cardiovascular: Negative.    Gastrointestinal: Negative.    Endocrine: Negative.    Genitourinary: Negative.    Musculoskeletal: Negative.    Skin: Positive for wound.   Neurological: Negative.    Hematological: Negative.    Psychiatric/Behavioral: Negative.          Patient Active Problem List    Diagnosis Date Noted     Anemia, unspecified type 05/17/2022     Priority: Medium     Hx of adenomatous polyp of colon 10/19/2021     Priority: Medium     Last colonoscopy May 2021 with 3 adenomatous polyps and recommendation for 3-year follow-up if health allows.       Type 2 diabetes mellitus without complication, without long-term current use of insulin (H) 04/05/2018     Priority: Medium     A1c 6.8%         Pseudobulbar affect 03/13/2017     Priority: Medium     Dysarthria 03/13/2017     Priority: Medium     Vertebral artery stenosis, left 12/28/2015     Priority: Medium     Cerebral infarction due to embolism of posterior cerebral artery (H) 11/04/2015     Priority: Medium     Bilateral Cerebellar & right thalamic         Obstructive sleep apnea 05/28/2015     Priority: Medium     Essential Hypercholesterolemia      Priority: Medium     Created by Conversion          Past Medical History:   Diagnosis Date     Basilar artery thrombosis      Cancer (H)     SKIN       "Dyslipidemia      Hyperlipidemia      CB (obstructive sleep apnea)      Sleep apnea      Stroke (H)     HERE WITH tia SYMPTOMS     Vertebral artery stenosis      Past Surgical History:   Procedure Laterality Date     HC KNEE SCOPE, DIAGNOSTIC      Description: Arthroscopy Knee Left;  Recorded: 02/18/2009;     HC KNEE SCOPE, DIAGNOSTIC      Description: Arthroscopy Knee Left;  Proc Date: 08/02/2007;     IR CEREBRAL ANGIOGRAM  11/5/2015     IR CEREBRAL ANGIOGRAM  6/20/2016     IR VENOUS STENT  12/28/2015     JOINT REPLACEMENT       KNEE SURGERY Bilateral      OTHER SURGICAL HISTORY Right 10/14/2015    partial meniscectomy knee. [facility], [facility]  11/4/2015          THROMBECTOMY       ZZC TOTAL KNEE ARTHROPLASTY Left     Description: Total Knee Arthroplasty;  Proc Date: 02/24/2009;  Comments: Dr. Richi Dos Santos at [facility]     Current Outpatient Medications   Medication Sig Dispense Refill     aspirin 81 MG EC tablet Take 81 mg by mouth       atorvastatin (LIPITOR) 40 MG tablet Take 1 tablet (40 mg) by mouth At Bedtime 90 tablet 3     B-D ULTRA-FINE 33 LANCETS MISC 1 strip       blood glucose (ONETOUCH ULTRA) test strip 1 each       Calcium Carbonate-Vit D-Min (CALCIUM 1200 PO) Take 1,200 mg by mouth daily       clopidogrel (PLAVIX) 75 MG tablet Take 1 tablet (75 mg) by mouth daily 90 tablet 3     Multiple Vitamins-Minerals (AIRBORNE GUMMIES PO)        multivitamin (CENTRUM SILVER) tablet Take 1 tablet by mouth daily         No Known Allergies     Social History     Tobacco Use     Smoking status: Former Smoker     Smokeless tobacco: Never Used   Substance Use Topics     Alcohol use: Not on file     Family History   Problem Relation Age of Onset     Snoring Father      Breast Cancer Mother      Heart Disease Father      Diabetes Father      History   Drug Use Not on file         Objective     /64   Pulse 64   Temp 98  F (36.7  C) (Oral)   Resp 16   Ht 1.778 m (5' 10\")  "  Wt 98 kg (216 lb)   SpO2 97%   BMI 30.99 kg/m      Physical Exam  General appearance - alert, well appearing, and in no distress and overweight  Mental status - normal mood, behavior, speech, dress, motor activity, and thought processes  Eyes - pupils equal and reactive, extraocular eye movements intact, left lower lid with small skin lesion mid area  Ears - bilateral TM's and external ear canals normal  Nose - normal and patent, no erythema, discharge   Mouth - not examined and Covered by mask  Neck - supple, no significant adenopathy, carotids upstroke normal bilaterally, no bruits, thyroid exam: thyroid is normal in size without nodules or tenderness  Chest - clear to auscultation, no wheezes, rales or rhonchi, symmetric air entry  Heart - normal rate and regular rhythm, no murmurs noted  Abdomen - soft, nontender, nondistended, no masses or organomegaly  Back exam -mildly limited range of motion, no tenderness, palpable spasm or pain on motion, antalgic gait  Neurological - alert, oriented, normal speech, no focal findings or movement disorder noted, cranial nerves II through XII intact, DTR's normal and symmetric  Musculoskeletal - no joint tenderness, deformity or swelling  Extremities - no pedal edema noted, warm, dry  Skin - normal coloration and turgor, no rashes, no suspicious skin lesions noted other than the lesion on the lower lid of left eye      Recent Labs   Lab Test 05/16/22  0726 10/18/21  0827 04/15/21  0826 09/27/20  1012   HGB 12.8*  --  13.9* 14.3   PLT  --   --  203 240     --  143  --    POTASSIUM 4.3  --  4.3  --    CR 0.96  --  1.09  --    A1C 6.5* 6.3* 6.7*  --         Diagnostics:  Recent Results (from the past 24 hour(s))   CBC with platelets    Collection Time: 07/05/22  1:02 PM   Result Value Ref Range    WBC Count 6.5 4.0 - 11.0 10e3/uL    RBC Count 4.99 4.40 - 5.90 10e6/uL    Hemoglobin 13.7 13.3 - 17.7 g/dL    Hematocrit 43.7 40.0 - 53.0 %    MCV 88 78 - 100 fL    MCH 27.5  26.5 - 33.0 pg    MCHC 31.4 (L) 31.5 - 36.5 g/dL    RDW 12.9 10.0 - 15.0 %    Platelet Count 225 150 - 450 10e3/uL   Basic metabolic panel    Collection Time: 07/05/22  1:02 PM   Result Value Ref Range    Creatinine 1.00 0.67 - 1.17 mg/dL    Sodium 143 136 - 145 mmol/L    Potassium 4.4 3.4 - 5.3 mmol/L    Urea Nitrogen 15.7 8.0 - 23.0 mg/dL    Chloride 106 98 - 107 mmol/L    Carbon Dioxide (CO2) 26 22 - 29 mmol/L    Anion Gap 11 7 - 15 mmol/L    Glucose 98 70 - 99 mg/dL    GFR Estimate 78 >60 mL/min/1.73m2    Calcium 9.5 8.8 - 10.2 mg/dL      No EKG required for low risk surgery (cataract, skin procedure, breast biopsy, etc).    Revised Cardiac Risk Index (RCRI):  The patient has the following serious cardiovascular risks for perioperative complications:   - Coronary Artery Disease (MI, positive stress test, angina, Qs on EKG) = 1 point   - Cerebrovascular Disease (TIA or CVA) = 1 point     RCRI Interpretation: 1 point: Class II (low risk - 0.9% complication rate)           Signed Electronically by: Nancy Hancock MD  Copy of this evaluation report is provided to requesting physician.

## 2022-07-05 NOTE — PATIENT INSTRUCTIONS
Preparing for Your Surgery  Getting started  A nurse will call you to review your health history and instructions. They will give you an arrival time based on your scheduled surgery time. Please be ready to share:    Your doctor's clinic name and phone number    Your medical, surgical and anesthesia history    A list of allergies and sensitivities    A list of medicines, including herbal treatments and over-the-counter drugs    Whether the patient has a legal guardian (ask how to send us the papers in advance)  Please tell us if you're pregnant--or if there's any chance you might be pregnant. Some surgeries may injure a fetus (unborn baby), so they require a pregnancy test. Surgeries that are safe for a fetus don't always need a test, and you can choose whether to have one.   If you have a child who's having surgery, please ask for a copy of Preparing for Your Child's Surgery.    Preparing for surgery    Within 30 days of surgery: Have a pre-op exam (sometimes called an H&P, or History and Physical). This can be done at a clinic or pre-operative center.  ? If you're having a , you may not need this exam. Talk to your care team.    At your pre-op exam, talk to your care team about all medicines you take. If you need to stop any medicines before surgery, ask when to start taking them again.  ? We do this for your safety. Many medicines can make you bleed too much during surgery. Some change how well surgery (anesthesia) drugs work.    Call your insurance company to let them know you're having surgery. (If you don't have insurance, call [phone].)    Call your clinic if there's any change in your health. This includes signs of a cold or flu (sore throat, runny nose, cough, rash, fever). It also includes a scrape or scratch near the surgery site.    If you have questions on the day of surgery, call your hospital or surgery center.  COVID testing  You may need to be tested for COVID-19 before having  surgery. If so, we will give you instructions.  Eating and drinking guidelines  For your safety: Unless your surgeon tells you otherwise, follow the guidelines below.    Eat and drink as usual until 8 hours before surgery. After that, no food or milk.    Drink clear liquids until 2 hours before surgery. These are liquids you can see through, like water, Gatorade and Propel Water. You may also have black coffee and tea (no cream or milk).    Nothing by mouth within 2 hours of surgery. This includes gum, candy and breath mints.    If you drink alcohol: Stop drinking it the night before surgery.    If your care team tells you to take medicine on the morning of surgery, it's okay to take it with a sip of water.  Preventing infection    Shower or bathe the night before and morning of your surgery. Follow the instructions your clinic gave you. (If no instructions, use regular soap.)    Don't shave or clip hair near your surgery site. We'll remove the hair if needed.    Don't smoke or vape the morning of surgery. You may chew nicotine gum up to 2 hours before surgery. A nicotine patch is okay.  ? Note: Some surgeries require you to completely quit smoking and nicotine. Check with your surgeon.    Your care team will make every effort to keep you safe from infection. We will:  ? Clean our hands often with soap and water (or an alcohol-based hand rub).  ? Clean the skin at your surgery site with a special soap that kills germs.  ? Give you a special gown to keep you warm. (Cold raises the risk of infection.)  ? Wear special hair covers, masks, gowns and gloves during surgery.  ? Give antibiotic medicine, if prescribed. Not all surgeries need antibiotics.  What to bring on the day of surgery    Photo ID and insurance card    Copy of your health care directive, if you have one    Glasses and hearing aides (bring cases)  ? You can't wear contacts during surgery    Inhaler and eye drops, if you use them (tell us about these when  you arrive)    CPAP machine or breathing device, if you use them    A few personal items, if spending the night    If you have . . .  ? A pacemaker, ICD (cardiac defibrillator) or other implant: Bring the ID card.  ? An implanted stimulator: Bring the remote control.  ? A legal guardian: Bring a copy of the certified (court-stamped) guardianship papers.  Please remove any jewelry, including body piercings. Leave jewelry and other valuables at home.  If you're going home the day of surgery    You must have a responsible adult drive you home. They should stay with you overnight as well.    If you don't have someone to stay with you, and you aren't safe to go home alone, we may keep you overnight. Insurance often won't pay for this.  After surgery  If it's hard to control your pain or you need more pain medicine, please call your surgeon's office.  Questions?   If you have any questions for your care team, list them here: _________________________________________________________________________________________________________________________________________________________________________ ____________________________________ ____________________________________ ____________________________________  For informational purposes only. Not to replace the advice of your health care provider. Copyright   2003, 2019 [facility]. All rights reserved. Clinically reviewed by Luli Banerjee MD. [employer] [id number] - REV 07/21.    How to Take Your Medication Before Surgery  - Take all of your medications before surgery except as noted below  - HOLD (do not take) Aspirin for 10 days  - HOLD (do not take) Plavix for 5 days prior to surgery  - STOP taking all vitamins and herbal supplements 14 days before surgery.

## 2022-08-05 ENCOUNTER — TRANSFERRED RECORDS (OUTPATIENT)
Dept: HEALTH INFORMATION MANAGEMENT | Facility: CLINIC | Age: 77
End: 2022-08-05

## 2022-08-05 LAB — RETINOPATHY: NEGATIVE

## 2022-08-31 ENCOUNTER — DOCUMENTATION ONLY (OUTPATIENT)
Dept: OTHER | Facility: CLINIC | Age: 77
End: 2022-08-31

## 2022-09-06 ENCOUNTER — OFFICE VISIT (OUTPATIENT)
Dept: FAMILY MEDICINE | Facility: CLINIC | Age: 77
End: 2022-09-06
Payer: MEDICARE

## 2022-09-06 VITALS
OXYGEN SATURATION: 97 % | TEMPERATURE: 97.7 F | HEIGHT: 70 IN | BODY MASS INDEX: 30.43 KG/M2 | WEIGHT: 212.6 LBS | HEART RATE: 57 BPM | SYSTOLIC BLOOD PRESSURE: 110 MMHG | DIASTOLIC BLOOD PRESSURE: 52 MMHG

## 2022-09-06 DIAGNOSIS — Z01.818 PREOP GENERAL PHYSICAL EXAM: Primary | ICD-10-CM

## 2022-09-06 DIAGNOSIS — I63.431 CEREBRAL INFARCTION DUE TO EMBOLISM OF RIGHT POSTERIOR CEREBRAL ARTERY (H): ICD-10-CM

## 2022-09-06 DIAGNOSIS — E78.00 PURE HYPERCHOLESTEROLEMIA: ICD-10-CM

## 2022-09-06 DIAGNOSIS — E11.9 TYPE 2 DIABETES MELLITUS WITHOUT COMPLICATION, WITHOUT LONG-TERM CURRENT USE OF INSULIN (H): ICD-10-CM

## 2022-09-06 DIAGNOSIS — H26.9 CATARACT OF BOTH EYES, UNSPECIFIED CATARACT TYPE: ICD-10-CM

## 2022-09-06 DIAGNOSIS — G47.33 OBSTRUCTIVE SLEEP APNEA: ICD-10-CM

## 2022-09-06 PROCEDURE — 99214 OFFICE O/P EST MOD 30 MIN: CPT | Performed by: NURSE PRACTITIONER

## 2022-09-06 RX ORDER — ERYTHROMYCIN 5 MG/G
OINTMENT OPHTHALMIC
COMMUNITY
Start: 2022-07-18 | End: 2023-04-11

## 2022-09-06 RX ORDER — PREDNISOLONE ACETATE 10 MG/ML
SUSPENSION/ DROPS OPHTHALMIC
COMMUNITY
Start: 2022-08-10 | End: 2023-04-11

## 2022-09-06 RX ORDER — ATORVASTATIN CALCIUM 40 MG/1
40 TABLET, FILM COATED ORAL AT BEDTIME
Qty: 90 TABLET | Refills: 3 | Status: SHIPPED | OUTPATIENT
Start: 2022-09-06 | End: 2023-04-11

## 2022-09-06 RX ORDER — KETOCONAZOLE 20 MG/ML
SHAMPOO TOPICAL
COMMUNITY
Start: 2022-08-08 | End: 2023-09-26

## 2022-09-06 ASSESSMENT — PAIN SCALES - GENERAL: PAINLEVEL: NO PAIN (0)

## 2022-09-06 NOTE — PATIENT INSTRUCTIONS
I will get your paperwork faxed to your surgeon.    Follow your surgeon's directions regarding eating and drinking prior to surgery.     You can continue your medications as previously prescribed.    Future refills will come to me from now on    Your surgeon will manage any complications after your procedure.

## 2022-09-06 NOTE — PROGRESS NOTES
[facility]  [address]  [place] [facility] [address]  Phone: [phone]  Fax: [phone]  Primary Provider: Scott Pelaez  Pre-op Performing Provider: AMADO HART    PREOPERATIVE EVALUATION:  Today's date: 9/6/2022    Kevin Webb is a 76 year old male who presents for a preoperative evaluation.    Surgical Information:  Right eye catarcct  Surgery/Procedure: right eye cataract  Surgery Location: [facility] (formerly [facility])  Surgeon: Dr. Perry  Surgery Date: 9/9/2022  Time of Surgery: TBD  Where patient plans to recover: At home with family  Fax number for surgical facility: [phone]    Type of Anesthesia Anticipated: to be determined    Assessment & Plan     The proposed surgical procedure is considered LOW risk.        ICD-10-CM    1. Preop general physical exam  Z01.818    2. Cataract of both eyes, unspecified cataract type  H26.9    3. Pure hypercholesterolemia  E78.00 atorvastatin (LIPITOR) 40 MG tablet   4. Type 2 diabetes mellitus without complication, without long-term current use of insulin (H)  E11.9    5. Obstructive sleep apnea  G47.33    6. 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  Follow eating and drinking restrictions from surgeons office.  We will have a .  No change to medications.  No supplements a week prior.           Risks and Recommendations:  The patient has the following additional risks and recommendations for perioperative complications:   - No identified additional risk factors other than previously addressed    Medication Instructions:  Patient is on no chronic medications    RECOMMENDATION:  APPROVAL GIVEN to proceed with proposed procedure, without further diagnostic evaluation.    Reviewed family medicine note x2, a1c x2, metabolic panel x1      Subjective     HPI related to upcoming procedure: Patient has been  dealing with decreased vision and cataracts were noted.  Recommendations were given for surgical correction.    Preop Questions 9/6/2022   1. Have you ever had a heart attack or stroke? YES - 2015 stroke   2. Have you ever had surgery on your heart or blood vessels, such as a stent placement, a coronary artery bypass, or surgery on an artery in your head, neck, heart, or legs? YES - left heart   3. Do you have chest pain with activity? No   4. Do you have a history of  heart failure? No   5. Do you currently have a cold, bronchitis or symptoms of other infection? No   6. Do you have a cough, shortness of breath, or wheezing? No   7. Do you or anyone in your family have previous history of blood clots? YES - hx cva   8. Do you or does anyone in your family have a serious bleeding problem such as prolonged bleeding following surgeries or cuts? No   9. Have you ever had problems with anemia or been told to take iron pills? No   10. Have you had any abnormal blood loss such as black, tarry or bloody stools? No   11. Have you ever had a blood transfusion? No   12. Are you willing to have a blood transfusion if it is medically needed before, during, or after your surgery? Yes   13. Have you or any of your relatives ever had problems with anesthesia? No   14. Do you have sleep apnea, excessive snoring or daytime drowsiness? YES - Good about using cpap machine   14a. Do you have a CPAP machine? Yes   15. Do you have any artifical heart valves or other implanted medical devices like a pacemaker, defibrillator, or continuous glucose monitor? No   16. Do you have artificial joints? YES    17. Are you allergic to latex? No       Health Care Directive:  Patient has a Health Care Directive on file      Preoperative Review of :   reviewed - no record of controlled substances prescribed.    Status of Chronic Conditions:  See problem list for active medical problems.  Problems all longstanding and stable, except as  noted/documented.  See ROS for pertinent symptoms related to these conditions.      Review of Systems  CONSTITUTIONAL: NEGATIVE for fever, chills, change in weight  INTEGUMENTARY/SKIN: NEGATIVE for worrisome rashes, moles or lesions  EYES: NEGATIVE for vision changes or irritation  ENT/MOUTH: NEGATIVE for ear, mouth and throat problems  RESP: NEGATIVE for significant cough or SOB  CV: NEGATIVE for chest pain, palpitations or peripheral edema  GI: NEGATIVE for nausea, abdominal pain, heartburn, or change in bowel habits  : NEGATIVE for frequency, dysuria, or hematuria  MUSCULOSKELETAL: NEGATIVE for significant arthralgias or myalgia  NEURO: NEGATIVE for weakness, dizziness or paresthesias  ENDOCRINE: NEGATIVE for temperature intolerance, skin/hair changes  HEME: NEGATIVE for bleeding problems  PSYCHIATRIC: NEGATIVE for changes in mood or affect    Patient Active Problem List    Diagnosis Date Noted     Anemia, unspecified type 05/17/2022     Priority: Medium     Hx of adenomatous polyp of colon 10/19/2021     Priority: Medium     Last colonoscopy May 2021 with 3 adenomatous polyps and recommendation for 3-year follow-up if health allows.       Type 2 diabetes mellitus without complication, without long-term current use of insulin (H) 04/05/2018     Priority: Medium     A1c 6.8%         Pseudobulbar affect 03/13/2017     Priority: Medium     Dysarthria 03/13/2017     Priority: Medium     Vertebral artery stenosis, left 12/28/2015     Priority: Medium     Cerebral infarction due to embolism of posterior cerebral artery (H) 11/04/2015     Priority: Medium     Bilateral Cerebellar & right thalamic         Obstructive sleep apnea 05/28/2015     Priority: Medium     Essential Hypercholesterolemia      Priority: Medium     Created by Conversion          Past Medical History:   Diagnosis Date     Basilar artery thrombosis      Cancer (H)     SKIN      Dyslipidemia      Hyperlipidemia      CB (obstructive sleep apnea)       Sleep apnea      Stroke (H)     HERE WITH tia SYMPTOMS     Vertebral artery stenosis      Past Surgical History:   Procedure Laterality Date     HC KNEE SCOPE, DIAGNOSTIC      Description: Arthroscopy Knee Left;  Recorded: 02/18/2009;      KNEE SCOPE, DIAGNOSTIC      Description: Arthroscopy Knee Left;  Proc Date: 08/02/2007;     IR CEREBRAL ANGIOGRAM  11/5/2015     IR CEREBRAL ANGIOGRAM  6/20/2016     IR VENOUS STENT  12/28/2015     JOINT REPLACEMENT       KNEE SURGERY Bilateral      OTHER SURGICAL HISTORY Right 10/14/2015    [facility]  11/[facility]          THROMBECTOMY       ZC TOTAL KNEE ARTHROPLASTY Left     Description: Total Knee Arthroplasty;  Proc Date: 02/24/2009;  Comments: Dr. Richi [facility]Sntos [facility]     Current Outpatient Medications   Medication Sig Dispense Refill     aspirin 81 MG EC tablet Take 81 mg by mouth       atorvastatin (LIPITOR) 40 MG tablet Take 1 tablet (40 mg) by mouth At Bedtime 90 tablet 3     B-D ULTRA-FINE 33 LANCETS MISC 1 strip       blood glucose (ONETOUCH ULTRA) test strip 1 each       Calcium Carbonate-Vit D-Min (CALCIUM 1200 PO) Take 1,200 mg by mouth daily       clopidogrel (PLAVIX) 75 MG tablet Take 1 tablet (75 mg) by mouth daily 90 tablet 3     erythromycin (ROMYCIN) 5 MG/GM ophthalmic ointment        ketoconazole (NIZORAL) 2 % external shampoo        Multiple Vitamins-Minerals (AIRBORNE GUMMIES PO)        multivitamin (CENTRUM SILVER) tablet Take 1 tablet by mouth daily       prednisoLONE acetate (PRED FORTE) 1 % ophthalmic suspension          No Known Allergies     Social History     Tobacco Use     Smoking status: Former Smoker     Smokeless tobacco: Never Used   Substance Use Topics     Alcohol use: Not on file     Family History   Problem Relation Age of Onset     Snoring Father      Breast Cancer Mother      Heart Disease Father      Diabetes Father      History   Drug Use Not on file        "  Objective     /52 (BP Location: Right arm, Patient Position: Sitting, Cuff Size: Adult Large)   Pulse 57   Temp 97.7  F (36.5  C)   Ht 1.772 m (5' 9.75\")   Wt 96.4 kg (212 lb 9.6 oz)   SpO2 97%   BMI 30.72 kg/m      Physical Exam    GENERAL APPEARANCE: healthy, alert and no distress     EYES: EOMI,  PERRL     HENT: ear canals and TM's normal and nose and mouth without ulcers or lesions     NECK: no adenopathy, no asymmetry, masses, or scars and thyroid normal to palpation     RESP: lungs clear to auscultation - no rales, rhonchi or wheezes     CV: regular rates and rhythm, normal S1 S2, no S3 or S4 and no murmur, click or rub     ABDOMEN:  soft, nontender, no HSM or masses and bowel sounds normal     MS: extremities normal- no gross deformities noted, no evidence of inflammation in joints, FROM in all extremities.     SKIN: no suspicious lesions or rashes     NEURO: Normal strength and tone, sensory exam grossly normal, mentation intact and speech normal     PSYCH: mentation appears normal. and affect normal/bright     LYMPHATICS: No cervical adenopathy    Recent Labs   Lab Test 07/05/22  1302 05/16/22  0726 10/18/21  0827 04/15/21  0826   HGB 13.7 12.8*  --  13.9*     --   --  203    143  --  143   POTASSIUM 4.4 4.3  --  4.3   CR 1.00 0.96  --  1.09   A1C  --  6.5* 6.3* 6.7*        Diagnostics:  No labs were ordered during this visit.   No EKG required for low risk surgery (cataract, skin procedure, breast biopsy, etc).    Revised Cardiac Risk Index (RCRI):  The patient has the following serious cardiovascular risks for perioperative complications:   - No serious cardiac risks = 0 points     RCRI Interpretation: 0 points: Class I (very low risk - 0.4% complication rate)     Signed Electronically by: Francis Wren NP  Copy of this evaluation report is provided to requesting physician.  "

## 2022-09-07 NOTE — PROGRESS NOTES
Pre-op faxed to [facility] at [phone]. Copy left in provider's grey bin and original sent to scan.     Fanny Schmid CMA.

## 2022-10-16 ENCOUNTER — HEALTH MAINTENANCE LETTER (OUTPATIENT)
Age: 77
End: 2022-10-16

## 2022-10-23 DIAGNOSIS — I63.431 CEREBRAL INFARCTION DUE TO EMBOLISM OF RIGHT POSTERIOR CEREBRAL ARTERY (H): ICD-10-CM

## 2022-10-24 RX ORDER — CLOPIDOGREL BISULFATE 75 MG/1
75 TABLET ORAL DAILY
Qty: 90 TABLET | Refills: 2 | Status: SHIPPED | OUTPATIENT
Start: 2022-10-24 | End: 2023-04-11

## 2022-10-24 NOTE — TELEPHONE ENCOUNTER
"Last Written Prescription Date:  10/18/2021  Last Fill Quantity: 90,  # refills: 3   Last office visit provider:  9/6/2022     Requested Prescriptions   Pending Prescriptions Disp Refills     clopidogrel (PLAVIX) 75 MG tablet [Pharmacy Med Name: CLOPIDOGREL 75MG] 90 tablet 2     Sig: TAKE 1 TABLET (75 MG) BY MOUTH DAILY       Plavix Passed - 10/23/2022  9:42 AM        Passed - No active PPI on record unless is Protonix        Passed - Normal HGB on file in past 12 months     Recent Labs   Lab Test 07/05/22  1302   HGB 13.7               Passed - Normal Platelets on file in past 12 months     Recent Labs   Lab Test 07/05/22  1302                  Passed - Recent (12 mo) or future (30 days) visit within the authorizing provider's specialty     Patient has had an office visit with the authorizing provider or a provider within the authorizing providers department within the previous 12 mos or has a future within next 30 days. See \"Patient Info\" tab in inbasket, or \"Choose Columns\" in Meds & Orders section of the refill encounter.              Passed - Medication is active on med list        Passed - Patient is age 18 or older             Antonella Lubin RN 10/24/22 5:06 PM  "

## 2022-10-26 ENCOUNTER — TRANSFERRED RECORDS (OUTPATIENT)
Dept: HEALTH INFORMATION MANAGEMENT | Facility: CLINIC | Age: 77
End: 2022-10-26

## 2022-12-04 ENCOUNTER — HEALTH MAINTENANCE LETTER (OUTPATIENT)
Age: 77
End: 2022-12-04

## 2023-04-04 ENCOUNTER — TRANSFERRED RECORDS (OUTPATIENT)
Dept: HEALTH INFORMATION MANAGEMENT | Facility: CLINIC | Age: 78
End: 2023-04-04
Payer: MEDICARE

## 2023-04-06 ENCOUNTER — TRANSFERRED RECORDS (OUTPATIENT)
Dept: HEALTH INFORMATION MANAGEMENT | Facility: CLINIC | Age: 78
End: 2023-04-06
Payer: MEDICARE

## 2023-04-10 ASSESSMENT — ENCOUNTER SYMPTOMS
JOINT SWELLING: 0
HEARTBURN: 0
PARESTHESIAS: 0
WEAKNESS: 0
COUGH: 0
ABDOMINAL PAIN: 0
SORE THROAT: 0
FEVER: 0
HEADACHES: 0
DIZZINESS: 1
SHORTNESS OF BREATH: 0
CHILLS: 0
HEMATURIA: 0
CONSTIPATION: 0
FREQUENCY: 0
NERVOUS/ANXIOUS: 0
ARTHRALGIAS: 0
DYSURIA: 0
MYALGIAS: 0
EYE PAIN: 0
DIARRHEA: 0
NAUSEA: 0
HEMATOCHEZIA: 0
PALPITATIONS: 0

## 2023-04-10 ASSESSMENT — ACTIVITIES OF DAILY LIVING (ADL): CURRENT_FUNCTION: NO ASSISTANCE NEEDED

## 2023-04-11 ENCOUNTER — OFFICE VISIT (OUTPATIENT)
Dept: FAMILY MEDICINE | Facility: CLINIC | Age: 78
End: 2023-04-11
Payer: MEDICARE

## 2023-04-11 VITALS
TEMPERATURE: 97.7 F | BODY MASS INDEX: 29.5 KG/M2 | SYSTOLIC BLOOD PRESSURE: 110 MMHG | HEART RATE: 61 BPM | RESPIRATION RATE: 16 BRPM | DIASTOLIC BLOOD PRESSURE: 58 MMHG | WEIGHT: 210.7 LBS | HEIGHT: 71 IN | OXYGEN SATURATION: 97 %

## 2023-04-11 DIAGNOSIS — I63.431 CEREBRAL INFARCTION DUE TO EMBOLISM OF RIGHT POSTERIOR CEREBRAL ARTERY (H): ICD-10-CM

## 2023-04-11 DIAGNOSIS — E78.00 PURE HYPERCHOLESTEROLEMIA: ICD-10-CM

## 2023-04-11 DIAGNOSIS — Z00.00 ENCOUNTER FOR MEDICARE ANNUAL WELLNESS EXAM: Primary | ICD-10-CM

## 2023-04-11 DIAGNOSIS — E11.9 TYPE 2 DIABETES MELLITUS WITHOUT COMPLICATION, WITHOUT LONG-TERM CURRENT USE OF INSULIN (H): ICD-10-CM

## 2023-04-11 LAB
ALBUMIN SERPL BCG-MCNC: 4.2 G/DL (ref 3.5–5.2)
ALP SERPL-CCNC: 72 U/L (ref 40–129)
ALT SERPL W P-5'-P-CCNC: 51 U/L (ref 10–50)
ANION GAP SERPL CALCULATED.3IONS-SCNC: 14 MMOL/L (ref 7–15)
AST SERPL W P-5'-P-CCNC: 38 U/L (ref 10–50)
BILIRUB SERPL-MCNC: 0.6 MG/DL
BUN SERPL-MCNC: 18.3 MG/DL (ref 8–23)
CALCIUM SERPL-MCNC: 9.1 MG/DL (ref 8.8–10.2)
CHLORIDE SERPL-SCNC: 108 MMOL/L (ref 98–107)
CHOLEST SERPL-MCNC: 88 MG/DL
CREAT SERPL-MCNC: 1.07 MG/DL (ref 0.67–1.17)
CREAT UR-MCNC: 279 MG/DL
DEPRECATED HCO3 PLAS-SCNC: 23 MMOL/L (ref 22–29)
GFR SERPL CREATININE-BSD FRML MDRD: 71 ML/MIN/1.73M2
GLUCOSE SERPL-MCNC: 124 MG/DL (ref 70–99)
HBA1C MFR BLD: 6.6 % (ref 0–5.6)
HDLC SERPL-MCNC: 31 MG/DL
LDLC SERPL CALC-MCNC: 37 MG/DL
MICROALBUMIN UR-MCNC: 12.8 MG/L
MICROALBUMIN/CREAT UR: 4.59 MG/G CR (ref 0–17)
NONHDLC SERPL-MCNC: 57 MG/DL
POTASSIUM SERPL-SCNC: 4.6 MMOL/L (ref 3.4–5.3)
PROT SERPL-MCNC: 6.7 G/DL (ref 6.4–8.3)
SODIUM SERPL-SCNC: 145 MMOL/L (ref 136–145)
TRIGL SERPL-MCNC: 100 MG/DL

## 2023-04-11 PROCEDURE — 80061 LIPID PANEL: CPT | Performed by: NURSE PRACTITIONER

## 2023-04-11 PROCEDURE — 80053 COMPREHEN METABOLIC PANEL: CPT | Performed by: NURSE PRACTITIONER

## 2023-04-11 PROCEDURE — 82043 UR ALBUMIN QUANTITATIVE: CPT | Performed by: NURSE PRACTITIONER

## 2023-04-11 PROCEDURE — 83036 HEMOGLOBIN GLYCOSYLATED A1C: CPT | Performed by: NURSE PRACTITIONER

## 2023-04-11 PROCEDURE — 82570 ASSAY OF URINE CREATININE: CPT | Performed by: NURSE PRACTITIONER

## 2023-04-11 PROCEDURE — 99214 OFFICE O/P EST MOD 30 MIN: CPT | Mod: 25 | Performed by: NURSE PRACTITIONER

## 2023-04-11 PROCEDURE — 36415 COLL VENOUS BLD VENIPUNCTURE: CPT | Performed by: NURSE PRACTITIONER

## 2023-04-11 PROCEDURE — G0439 PPPS, SUBSEQ VISIT: HCPCS | Performed by: NURSE PRACTITIONER

## 2023-04-11 RX ORDER — ATORVASTATIN CALCIUM 40 MG/1
40 TABLET, FILM COATED ORAL AT BEDTIME
Qty: 90 TABLET | Refills: 3 | Status: SHIPPED | OUTPATIENT
Start: 2023-04-11 | End: 2023-09-26

## 2023-04-11 RX ORDER — CLOPIDOGREL BISULFATE 75 MG/1
75 TABLET ORAL DAILY
Qty: 90 TABLET | Refills: 3 | Status: SHIPPED | OUTPATIENT
Start: 2023-04-11 | End: 2023-09-26

## 2023-04-11 ASSESSMENT — ENCOUNTER SYMPTOMS
FREQUENCY: 0
CHILLS: 0
HEMATOCHEZIA: 0
NERVOUS/ANXIOUS: 0
CONSTIPATION: 0
DIZZINESS: 1
ARTHRALGIAS: 0
COUGH: 0
PALPITATIONS: 0
NAUSEA: 0
PARESTHESIAS: 0
WEAKNESS: 0
EYE PAIN: 0
SORE THROAT: 0
HEARTBURN: 0
DIARRHEA: 0
DYSURIA: 0
FEVER: 0
JOINT SWELLING: 0
ABDOMINAL PAIN: 0
MYALGIAS: 0
SHORTNESS OF BREATH: 0
HEADACHES: 0
HEMATURIA: 0

## 2023-04-11 ASSESSMENT — ACTIVITIES OF DAILY LIVING (ADL): CURRENT_FUNCTION: NO ASSISTANCE NEEDED

## 2023-04-11 ASSESSMENT — PAIN SCALES - GENERAL: PAINLEVEL: NO PAIN (0)

## 2023-04-11 NOTE — PROGRESS NOTES
"SUBJECTIVE:   Kevin is a 77 year old who presents for Preventive Visit.      4/11/2023     6:56 AM   Additional Questions   Roomed by Gilma COLBERT LPN   Accompanied by wife   Patient has been advised of split billing requirements and indicates understanding: Yes  Are you in the first 12 months of your Medicare coverage?  No    Overall doing okay.  Continues with Plavix for stroke prevention following his CVA history.    Lab Results   Component Value Date    A1C 6.5 05/16/2022    A1C 6.3 10/18/2021    A1C 6.7 04/15/2021    A1C 6.0 07/21/2020    A1C 6.1 10/03/2019     Diabetes currently managed with lifestyle.  No hypo-/hyperglycemia symptoms (polydipsia, polyuria, etc.)    Due for repeat colonoscopy next year.    Usually goes to the [facility] 3 times a week. '    Lives in a one level home    Healthy Habits:     In general, how would you rate your overall health?  Good    Frequency of exercise:  2-3 days/week    Duration of exercise:  30-45 minutes    Do you usually eat at least 4 servings of fruit and vegetables a day, include whole grains    & fiber and avoid regularly eating high fat or \"junk\" foods?  Yes    Taking medications regularly:  Yes    Medication side effects:  None    Ability to successfully perform activities of daily living:  No assistance needed    Home Safety:  No safety concerns identified    Hearing Impairment:  No hearing concerns    In the past 6 months, have you been bothered by leaking of urine?  No    In general, how would you rate your overall mental or emotional health?  Excellent      PHQ-2 Total Score: 0    Additional concerns today:  No      Have you ever done Advance Care Planning? (For example, a Health Directive, POLST, or a discussion with a medical provider or your loved ones about your wishes): Yes, advance care planning is on file.    Fall risk  Fallen 2 or more times in the past year?: No  Any fall with injury in the past year?: No    Cognitive Screening   1) Repeat 3 items (Leader, " Season, Table)    2) Clock draw: NORMAL  3) 3 item recall: Recalls 1 object   Results: NORMAL clock, 1-2 items recalled: COGNITIVE IMPAIRMENT LESS LIKELY    Mini-CogTM Copyright TIM Monzon. Licensed by the author for use in [facility]; reprinted with permission (joaquin@[facility]). All rights reserved.        Reviewed and updated as needed this visit by clinical staff   Tobacco  Allergies  Meds              Reviewed and updated as needed this visit by Provider                 Social History     Tobacco Use     Smoking status: Former     Passive exposure: Past     Smokeless tobacco: Never   Vaping Use     Vaping status: Never Used   Substance Use Topics     Alcohol use: Not on file           4/10/2023    10:35 AM   Alcohol Use   Prescreen: >3 drinks/day or >7 drinks/week? No     Do you have a current opioid prescription? No  Do you use any other controlled substances or medications that are not prescribed by a provider? None  56}  Current providers sharing in care for this patient include:   Patient Care Team:  Francis Wren NP as PCP - General  Francis Wren NP as Assigned PCP    The following health maintenance items are reviewed in Epic and correct as of today:  Health Maintenance   Topic Date Due     COVID-19 Vaccine (5 - Booster) 12/23/2021     MEDICARE ANNUAL WELLNESS VISIT  04/15/2022     DTAP/TDAP/TD IMMUNIZATION (2 - Td or Tdap) 09/18/2022     MICROALBUMIN  10/18/2022     DIABETIC FOOT EXAM  10/18/2022     ANNUAL REVIEW OF HM ORDERS  10/18/2022     A1C  11/16/2022     LIPID  05/16/2023     BMP  07/05/2023     EYE EXAM  04/04/2024     FALL RISK ASSESSMENT  04/11/2024     ADVANCE CARE PLANNING  08/31/2027     COLORECTAL CANCER SCREENING  05/12/2031     HEPATITIS C SCREENING  Completed     PHQ-2 (once per calendar year)  Completed     INFLUENZA VACCINE  Completed     Pneumococcal Vaccine: 65+ Years  Completed     ZOSTER IMMUNIZATION  Completed     IPV IMMUNIZATION  Aged Out     MENINGITIS  "IMMUNIZATION  Aged Out     LUNG CANCER SCREENING  Discontinued     Lab work is in process    Review of Systems   Constitutional: Negative for chills and fever.   HENT: Negative for congestion, ear pain, hearing loss and sore throat.    Eyes: Positive for visual disturbance. Negative for pain.   Respiratory: Negative for cough and shortness of breath.    Cardiovascular: Negative for chest pain, palpitations and peripheral edema.   Gastrointestinal: Negative for abdominal pain, constipation, diarrhea, heartburn, hematochezia and nausea.   Genitourinary: Positive for impotence. Negative for dysuria, frequency, genital sores, hematuria, penile discharge and urgency.   Musculoskeletal: Negative for arthralgias, joint swelling and myalgias.   Skin: Negative for rash.   Neurological: Positive for dizziness. Negative for weakness, headaches and paresthesias.   Psychiatric/Behavioral: Negative for mood changes. The patient is not nervous/anxious.        OBJECTIVE:   /58   Pulse 61   Temp 97.7  F (36.5  C) (Oral)   Resp 16   Ht 1.803 m (5' 11\")   Wt 95.6 kg (210 lb 11.2 oz)   SpO2 97%   BMI 29.39 kg/m   Estimated body mass index is 29.39 kg/m  as calculated from the following:    Height as of this encounter: 1.803 m (5' 11\").    Weight as of this encounter: 95.6 kg (210 lb 11.2 oz).  Physical Exam  GENERAL: healthy, alert and no distress  EYES: Eyes grossly normal to inspection, PERRL and conjunctivae and sclerae normal  HENT: ear canals and TM's normal, nose and mouth without ulcers or lesions  NECK: no adenopathy, no asymmetry, masses, or scars and thyroid normal to palpation  RESP: lungs clear to auscultation - no rales, rhonchi or wheezes  CV: regular rate and rhythm, normal S1 S2, no S3 or S4, no murmur, click or rub, no peripheral edema and peripheral pulses strong  ABDOMEN: soft, nontender, no hepatosplenomegaly, no masses and bowel sounds normal  MS: no gross musculoskeletal defects noted, no edema  SKIN: " "no suspicious lesions or rashes  NEURO: Diminished strength and range of motion and muscle tone on left side.  Slightly antalgic gait favoring the left side.  Slow sit to stand.  PSYCH: mentation appears normal, affect normal/bright    Diagnostic Test Results:  Labs reviewed in Epic    ASSESSMENT / PLAN:       ICD-10-CM    1. Encounter for Medicare annual wellness exam  Z00.00       2. Pure hypercholesterolemia  E78.00 atorvastatin (LIPITOR) 40 MG tablet     Comprehensive metabolic panel (BMP + Alb, Alk Phos, ALT, AST, Total. Bili, TP)     Lipid panel      3. 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 Type 2 diabetes mellitus without complication, without long-term current use of insulin (H)  E11.9 Albumin Random Urine Quantitative with Creat Ratio     HEMOGLOBIN A1C     Comprehensive metabolic panel (BMP + Alb, Alk Phos, ALT, AST, Total. Bili, TP)        Overall doing well.  Continue to check diabetic labs.  Okay to continue clopidogrel for CVA prevention.  Continue same statin therapy.  Recheck lipids with goal of LDL under 70.  Continue staying active at the [facility].  Discussed that he could get a tetanus and if he has Medicare part D this would be covered at a pharmacy.    Patient has been advised of split billing requirements and indicates understanding: Yes      COUNSELING:  Reviewed preventive health counseling, as reflected in patient instructions      BMI:   Estimated body mass index is 29.39 kg/m  as calculated from the following:    Height as of this encounter: 1.803 m (5' 11\").    Weight as of this encounter: 95.6 kg (210 lb 11.2 oz).         He reports that he has quit smoking. He has been exposed to tobacco smoke. He has never used smokeless tobacco.      Appropriate preventive services were discussed with this patient, including applicable screening as appropriate for cardiovascular disease, diabetes, " osteopenia/osteoporosis, and glaucoma.  As appropriate for age/gender, discussed screening for colorectal cancer, prostate cancer, breast cancer, and cervical cancer. Checklist reviewing preventive services available has been given to the patient.    Reviewed patients plan of care and provided an AVS. The Basic Care Plan (routine screening as documented in Health Maintenance) for Kevin meets the Care Plan requirement. This Care Plan has been established and reviewed with the Patient.      Francis Wren NP  [facility]    Identified Health Risks:    I have reviewed Opioid Use Disorder and Substance Use Disorder risk factors and made any needed referrals.

## 2023-04-11 NOTE — PATIENT INSTRUCTIONS
Patient Education   Personalized Prevention Plan  You are due for the preventive services outlined below.  Your care team is available to assist you in scheduling these services.  If you have already completed any of these items, please share that information with your care team to update in your medical record.  Health Maintenance Due   Topic Date Due     Annual Wellness Visit  04/15/2022     Diptheria Tetanus Pertussis (DTAP/TDAP/TD) Vaccine (2 - Td or Tdap) 09/18/2022     Kidney Microalbumin Urine Test  10/18/2022     Diabetic Foot Exam  10/18/2022     ANNUAL REVIEW OF HM ORDERS  10/18/2022     A1C Lab  11/16/2022

## 2023-04-12 ENCOUNTER — TRANSFERRED RECORDS (OUTPATIENT)
Dept: HEALTH INFORMATION MANAGEMENT | Facility: CLINIC | Age: 78
End: 2023-04-12
Payer: MEDICARE

## 2023-05-16 DIAGNOSIS — G47.33 OBSTRUCTIVE SLEEP APNEA (ADULT) (PEDIATRIC): Primary | ICD-10-CM

## 2023-09-26 ENCOUNTER — OFFICE VISIT (OUTPATIENT)
Dept: FAMILY MEDICINE | Facility: CLINIC | Age: 78
End: 2023-09-26
Payer: MEDICARE

## 2023-09-26 VITALS
BODY MASS INDEX: 28.84 KG/M2 | HEIGHT: 71 IN | SYSTOLIC BLOOD PRESSURE: 118 MMHG | TEMPERATURE: 98.3 F | HEART RATE: 60 BPM | RESPIRATION RATE: 16 BRPM | OXYGEN SATURATION: 97 % | WEIGHT: 206 LBS | DIASTOLIC BLOOD PRESSURE: 58 MMHG

## 2023-09-26 DIAGNOSIS — E78.00 PURE HYPERCHOLESTEROLEMIA: ICD-10-CM

## 2023-09-26 DIAGNOSIS — D64.9 ANEMIA, UNSPECIFIED TYPE: Primary | ICD-10-CM

## 2023-09-26 DIAGNOSIS — I63.431 CEREBRAL INFARCTION DUE TO EMBOLISM OF RIGHT POSTERIOR CEREBRAL ARTERY (H): ICD-10-CM

## 2023-09-26 DIAGNOSIS — E11.9 TYPE 2 DIABETES MELLITUS WITHOUT COMPLICATION, WITHOUT LONG-TERM CURRENT USE OF INSULIN (H): ICD-10-CM

## 2023-09-26 DIAGNOSIS — G47.33 OBSTRUCTIVE SLEEP APNEA: ICD-10-CM

## 2023-09-26 LAB
ALBUMIN SERPL BCG-MCNC: 4.3 G/DL (ref 3.5–5.2)
ALP SERPL-CCNC: 74 U/L (ref 40–129)
ALT SERPL W P-5'-P-CCNC: 41 U/L (ref 0–70)
ANION GAP SERPL CALCULATED.3IONS-SCNC: 12 MMOL/L (ref 7–15)
AST SERPL W P-5'-P-CCNC: 30 U/L (ref 0–45)
BILIRUB SERPL-MCNC: 0.6 MG/DL
BUN SERPL-MCNC: 19.3 MG/DL (ref 8–23)
CALCIUM SERPL-MCNC: 9.2 MG/DL (ref 8.8–10.2)
CHLORIDE SERPL-SCNC: 105 MMOL/L (ref 98–107)
CREAT SERPL-MCNC: 1.05 MG/DL (ref 0.67–1.17)
DEPRECATED HCO3 PLAS-SCNC: 25 MMOL/L (ref 22–29)
EGFRCR SERPLBLD CKD-EPI 2021: 73 ML/MIN/1.73M2
ERYTHROCYTE [DISTWIDTH] IN BLOOD BY AUTOMATED COUNT: 12.7 % (ref 10–15)
FERRITIN SERPL-MCNC: 105 NG/ML (ref 31–409)
GLUCOSE SERPL-MCNC: 121 MG/DL (ref 70–99)
HBA1C MFR BLD: 6.4 % (ref 0–5.6)
HCT VFR BLD AUTO: 41.7 % (ref 40–53)
HGB BLD-MCNC: 13.4 G/DL (ref 13.3–17.7)
IRON BINDING CAPACITY (ROCHE): 282 UG/DL (ref 240–430)
IRON SATN MFR SERPL: 40 % (ref 15–46)
IRON SERPL-MCNC: 113 UG/DL (ref 61–157)
MCH RBC QN AUTO: 28.3 PG (ref 26.5–33)
MCHC RBC AUTO-ENTMCNC: 32.1 G/DL (ref 31.5–36.5)
MCV RBC AUTO: 88 FL (ref 78–100)
PLATELET # BLD AUTO: 205 10E3/UL (ref 150–450)
POTASSIUM SERPL-SCNC: 4.8 MMOL/L (ref 3.4–5.3)
PROT SERPL-MCNC: 6.9 G/DL (ref 6.4–8.3)
RBC # BLD AUTO: 4.74 10E6/UL (ref 4.4–5.9)
SODIUM SERPL-SCNC: 142 MMOL/L (ref 135–145)
WBC # BLD AUTO: 6 10E3/UL (ref 4–11)

## 2023-09-26 PROCEDURE — 99214 OFFICE O/P EST MOD 30 MIN: CPT | Performed by: NURSE PRACTITIONER

## 2023-09-26 PROCEDURE — 83036 HEMOGLOBIN GLYCOSYLATED A1C: CPT | Performed by: NURSE PRACTITIONER

## 2023-09-26 PROCEDURE — 82728 ASSAY OF FERRITIN: CPT | Performed by: NURSE PRACTITIONER

## 2023-09-26 PROCEDURE — 83540 ASSAY OF IRON: CPT | Performed by: NURSE PRACTITIONER

## 2023-09-26 PROCEDURE — 80053 COMPREHEN METABOLIC PANEL: CPT | Performed by: NURSE PRACTITIONER

## 2023-09-26 PROCEDURE — 83550 IRON BINDING TEST: CPT | Performed by: NURSE PRACTITIONER

## 2023-09-26 PROCEDURE — 36415 COLL VENOUS BLD VENIPUNCTURE: CPT | Performed by: NURSE PRACTITIONER

## 2023-09-26 PROCEDURE — 85027 COMPLETE CBC AUTOMATED: CPT | Performed by: NURSE PRACTITIONER

## 2023-09-26 RX ORDER — CLOPIDOGREL BISULFATE 75 MG/1
75 TABLET ORAL DAILY
Qty: 90 TABLET | Refills: 3 | Status: SHIPPED | OUTPATIENT
Start: 2023-09-26 | End: 2024-05-28

## 2023-09-26 RX ORDER — ATORVASTATIN CALCIUM 40 MG/1
40 TABLET, FILM COATED ORAL AT BEDTIME
Qty: 90 TABLET | Refills: 3 | Status: SHIPPED | OUTPATIENT
Start: 2023-09-26 | End: 2024-05-28

## 2023-09-26 ASSESSMENT — PAIN SCALES - GENERAL: PAINLEVEL: NO PAIN (0)

## 2023-09-26 NOTE — PROGRESS NOTES
"Assessment & Plan     ICD-10-CM    1. Anemia, unspecified type  D64.9 CBC with platelets     Iron and iron binding capacity     Ferritin      2. 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 Pure hypercholesterolemia  E78.00 atorvastatin (LIPITOR) 40 MG tablet      4. Type 2 diabetes mellitus without complication, without long-term current use of insulin (H)  E11.9 Hemoglobin A1c     Comprehensive metabolic panel (BMP + Alb, Alk Phos, ALT, AST, Total. Bili, TP)      5. Obstructive sleep apnea  G47.33         Doing well.  Continues with same Lasix for future CVA prevention.  Continue statin therapy as well.  Update med monitoring labs.  Keep an eye on history of anemia.  Last B12 level was normal.  Check iron levels this time.  [place]    Subjective     HPI       HX CVA  Went to [place] for stem cell therapy at the end of August along with hyperbaric treatment.  Feels like he's able to walk a bit faster and stand taller.  Sleeping better. Has more energy. Has had more sleep initiation difficulty. Anticipate 4-6 months for full response.      HLD  On atorvastatin.  No diffuse myalgias    CB  Still using cpap religiously for naps and at nighttime.      T2DM    Lab Results   Component Value Date    A1C 6.6 04/11/2023    A1C 6.5 05/16/2022    A1C 6.3 10/18/2021    A1C 6.7 04/15/2021    A1C 6.0 07/21/2020       Review of Systems - negative except for what's listed in the HPI      Objective    /58   Pulse 60   Temp 98.3  F (36.8  C) (Temporal)   Resp 16   Ht 1.803 m (5' 11\")   Wt 93.4 kg (206 lb)   SpO2 97%   BMI 28.73 kg/m    Physical Exam   General appearance - alert, well appearing, and in no distress  Mental status - alert, oriented to person, place, and time  Eyes -PERRLA  Ears - bilateral TM's and external ear canals normal  Nose - normal and patent, no erythema, " discharge or polyps  Mouth - mucous membranes moist. No oral lesions.  Neck - no significant adenopathy  Lymphatics - no palpable lymphadenopathy  Chest - clear to auscultation, no wheezes, rales or rhonchi, symmetric air entry  Heart - normal rate and regular rhythm, S1 and S2 normal, no murmurs noted  Abdomen - soft, nontender, nondistended, no masses or organomegaly  Neurological - alert, oriented, normal speech, no focal findings or movement disorder noted, neck supple without rigidity, cranial nerves II through XII intact, motor and sensory grossly normal bilaterally, normal muscle tone, no tremors, strength 5/5  Musculoskeletal - no joint tenderness, deformity or swelling  Extremities - peripheral pulses normal, no peripheral edema  Skin - normal coloration and turgor.    Francis Wren, CNP    This note has been dictated using voice recognition software. Any grammatical or context distortions are unintentional and inherent to the software.

## 2023-09-28 DIAGNOSIS — E11.9 TYPE 2 DIABETES MELLITUS WITHOUT COMPLICATION, WITHOUT LONG-TERM CURRENT USE OF INSULIN (H): Primary | ICD-10-CM

## 2023-09-29 RX ORDER — BLOOD SUGAR DIAGNOSTIC
STRIP MISCELLANEOUS
Qty: 100 STRIP | Status: SHIPPED | OUTPATIENT
Start: 2023-09-29

## 2024-04-29 ENCOUNTER — TRANSFERRED RECORDS (OUTPATIENT)
Dept: MULTI SPECIALTY CLINIC | Facility: CLINIC | Age: 79
End: 2024-04-29
Payer: MEDICARE

## 2024-04-29 LAB — RETINOPATHY: NORMAL

## 2024-05-23 SDOH — HEALTH STABILITY: PHYSICAL HEALTH: ON AVERAGE, HOW MANY MINUTES DO YOU ENGAGE IN EXERCISE AT THIS LEVEL?: 30 MIN

## 2024-05-23 SDOH — HEALTH STABILITY: PHYSICAL HEALTH: ON AVERAGE, HOW MANY DAYS PER WEEK DO YOU ENGAGE IN MODERATE TO STRENUOUS EXERCISE (LIKE A BRISK WALK)?: 3 DAYS

## 2024-05-23 ASSESSMENT — SOCIAL DETERMINANTS OF HEALTH (SDOH): HOW OFTEN DO YOU GET TOGETHER WITH FRIENDS OR RELATIVES?: MORE THAN THREE TIMES A WEEK

## 2024-05-28 ENCOUNTER — OFFICE VISIT (OUTPATIENT)
Dept: FAMILY MEDICINE | Facility: CLINIC | Age: 79
End: 2024-05-28
Payer: MEDICARE

## 2024-05-28 VITALS
HEIGHT: 70 IN | BODY MASS INDEX: 30.78 KG/M2 | WEIGHT: 215 LBS | OXYGEN SATURATION: 96 % | SYSTOLIC BLOOD PRESSURE: 106 MMHG | DIASTOLIC BLOOD PRESSURE: 60 MMHG | HEART RATE: 65 BPM | TEMPERATURE: 97.7 F

## 2024-05-28 DIAGNOSIS — I63.431 CEREBRAL INFARCTION DUE TO EMBOLISM OF RIGHT POSTERIOR CEREBRAL ARTERY (H): ICD-10-CM

## 2024-05-28 DIAGNOSIS — E78.00 PURE HYPERCHOLESTEROLEMIA: ICD-10-CM

## 2024-05-28 DIAGNOSIS — Z00.00 ENCOUNTER FOR MEDICARE ANNUAL WELLNESS EXAM: Primary | ICD-10-CM

## 2024-05-28 DIAGNOSIS — R39.15 URINARY URGENCY: ICD-10-CM

## 2024-05-28 DIAGNOSIS — E11.9 TYPE 2 DIABETES MELLITUS WITHOUT COMPLICATION, WITHOUT LONG-TERM CURRENT USE OF INSULIN (H): ICD-10-CM

## 2024-05-28 DIAGNOSIS — Z12.11 SCREEN FOR COLON CANCER: ICD-10-CM

## 2024-05-28 LAB
ALBUMIN SERPL BCG-MCNC: 4.4 G/DL (ref 3.5–5.2)
ALBUMIN UR-MCNC: NEGATIVE MG/DL
ALP SERPL-CCNC: 70 U/L (ref 40–150)
ALT SERPL W P-5'-P-CCNC: 58 U/L (ref 0–70)
ANION GAP SERPL CALCULATED.3IONS-SCNC: 12 MMOL/L (ref 7–15)
APPEARANCE UR: CLEAR
AST SERPL W P-5'-P-CCNC: 42 U/L (ref 0–45)
BILIRUB SERPL-MCNC: 0.7 MG/DL
BILIRUB UR QL STRIP: NEGATIVE
BUN SERPL-MCNC: 15 MG/DL (ref 8–23)
CALCIUM SERPL-MCNC: 9.2 MG/DL (ref 8.8–10.2)
CHLORIDE SERPL-SCNC: 104 MMOL/L (ref 98–107)
CHOLEST SERPL-MCNC: 92 MG/DL
COLOR UR AUTO: YELLOW
CREAT SERPL-MCNC: 0.96 MG/DL (ref 0.67–1.17)
CREAT UR-MCNC: 210 MG/DL
DEPRECATED HCO3 PLAS-SCNC: 24 MMOL/L (ref 22–29)
EGFRCR SERPLBLD CKD-EPI 2021: 80 ML/MIN/1.73M2
ERYTHROCYTE [DISTWIDTH] IN BLOOD BY AUTOMATED COUNT: 12.8 % (ref 10–15)
FASTING STATUS PATIENT QL REPORTED: YES
FASTING STATUS PATIENT QL REPORTED: YES
GLUCOSE SERPL-MCNC: 124 MG/DL (ref 70–99)
GLUCOSE UR STRIP-MCNC: NEGATIVE MG/DL
HBA1C MFR BLD: 6.6 % (ref 0–5.6)
HCT VFR BLD AUTO: 42.9 % (ref 40–53)
HDLC SERPL-MCNC: 30 MG/DL
HGB BLD-MCNC: 13.9 G/DL (ref 13.3–17.7)
HGB UR QL STRIP: ABNORMAL
KETONES UR STRIP-MCNC: NEGATIVE MG/DL
LDLC SERPL CALC-MCNC: 34 MG/DL
LEUKOCYTE ESTERASE UR QL STRIP: NEGATIVE
MCH RBC QN AUTO: 28.3 PG (ref 26.5–33)
MCHC RBC AUTO-ENTMCNC: 32.4 G/DL (ref 31.5–36.5)
MCV RBC AUTO: 87 FL (ref 78–100)
MICROALBUMIN UR-MCNC: 21.6 MG/L
MICROALBUMIN/CREAT UR: 10.29 MG/G CR (ref 0–17)
NITRATE UR QL: NEGATIVE
NONHDLC SERPL-MCNC: 62 MG/DL
PH UR STRIP: 6 [PH] (ref 5–8)
PLATELET # BLD AUTO: 208 10E3/UL (ref 150–450)
POTASSIUM SERPL-SCNC: 4.5 MMOL/L (ref 3.4–5.3)
PROT SERPL-MCNC: 7.1 G/DL (ref 6.4–8.3)
RBC # BLD AUTO: 4.91 10E6/UL (ref 4.4–5.9)
SODIUM SERPL-SCNC: 140 MMOL/L (ref 135–145)
SP GR UR STRIP: 1.02 (ref 1–1.03)
TRIGL SERPL-MCNC: 140 MG/DL
UROBILINOGEN UR STRIP-ACNC: 0.2 E.U./DL
WBC # BLD AUTO: 6.2 10E3/UL (ref 4–11)

## 2024-05-28 PROCEDURE — 82043 UR ALBUMIN QUANTITATIVE: CPT | Performed by: NURSE PRACTITIONER

## 2024-05-28 PROCEDURE — G0439 PPPS, SUBSEQ VISIT: HCPCS | Performed by: NURSE PRACTITIONER

## 2024-05-28 PROCEDURE — 82570 ASSAY OF URINE CREATININE: CPT | Performed by: NURSE PRACTITIONER

## 2024-05-28 PROCEDURE — 81003 URINALYSIS AUTO W/O SCOPE: CPT | Performed by: NURSE PRACTITIONER

## 2024-05-28 PROCEDURE — 85027 COMPLETE CBC AUTOMATED: CPT | Performed by: NURSE PRACTITIONER

## 2024-05-28 PROCEDURE — 80053 COMPREHEN METABOLIC PANEL: CPT | Performed by: NURSE PRACTITIONER

## 2024-05-28 PROCEDURE — 83036 HEMOGLOBIN GLYCOSYLATED A1C: CPT | Performed by: NURSE PRACTITIONER

## 2024-05-28 PROCEDURE — 99214 OFFICE O/P EST MOD 30 MIN: CPT | Mod: 25 | Performed by: NURSE PRACTITIONER

## 2024-05-28 PROCEDURE — 87086 URINE CULTURE/COLONY COUNT: CPT | Performed by: NURSE PRACTITIONER

## 2024-05-28 PROCEDURE — 36415 COLL VENOUS BLD VENIPUNCTURE: CPT | Performed by: NURSE PRACTITIONER

## 2024-05-28 PROCEDURE — 80061 LIPID PANEL: CPT | Performed by: NURSE PRACTITIONER

## 2024-05-28 RX ORDER — CLOPIDOGREL BISULFATE 75 MG/1
75 TABLET ORAL DAILY
Qty: 90 TABLET | Refills: 3 | Status: SHIPPED | OUTPATIENT
Start: 2024-05-28

## 2024-05-28 RX ORDER — ATORVASTATIN CALCIUM 40 MG/1
40 TABLET, FILM COATED ORAL AT BEDTIME
Qty: 90 TABLET | Refills: 3 | Status: SHIPPED | OUTPATIENT
Start: 2024-05-28

## 2024-05-28 RX ORDER — RESPIRATORY SYNCYTIAL VIRUS VACCINE 120MCG/0.5
0.5 KIT INTRAMUSCULAR ONCE
Qty: 1 EACH | Refills: 0 | Status: CANCELLED | OUTPATIENT
Start: 2024-05-28 | End: 2024-05-28

## 2024-05-28 ASSESSMENT — PAIN SCALES - GENERAL: PAINLEVEL: NO PAIN (0)

## 2024-05-28 NOTE — PROGRESS NOTES
"Preventive Care Visit  [facility]  Francis Wren NP,    May 28, 2024      Assessment & Plan       ICD-10-CM    1. Encounter for Medicare annual wellness exam  Z00.00       2. Type 2 diabetes mellitus without complication, without long-term current use of insulin (H)  E11.9 HEMOGLOBIN A1C     Lipid panel reflex to direct LDL Non-fasting     Albumin Random Urine Quantitative with Creat Ratio     CBC with platelets     Comprehensive metabolic panel (BMP + Alb, Alk Phos, ALT, AST, Total. Bili, TP)      3. 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 Pure hypercholesterolemia  E78.00 atorvastatin (LIPITOR) 40 MG tablet      5. Urinary urgency  R39.15 Urinalysis Macroscopic - lab collect     Urine Culture Aerobic Bacterial - lab collect      6. Screen for colon cancer  Z12.11 Colonoscopy Screening  Referral        Updated med monitoring/screening labs.  Reviewed shots.  Eye exam abstracted.  Update colonoscopy.      BMI  Estimated body mass index is 30.85 kg/m  as calculated from the following:    Height as of this encounter: 1.778 m (5' 10\").    Weight as of this encounter: 97.5 kg (215 lb).   Weight management plan: Discussed healthy diet and exercise guidelines    Counseling  Appropriate preventive services were discussed with this patient, including applicable screening as appropriate for fall prevention, nutrition, physical activity, Tobacco-use cessation, weight loss and cognition.  Checklist reviewing preventive services available has been given to the patient.  Reviewed patient's diet, addressing concerns and/or questions.   He is at risk for lack of exercise and has been provided with information to increase physical activity for the benefit of his well-being.   The patient was provided with written information regarding signs of hearing loss.   Information on urinary incontinence and treatment " options given to patient.       Elise Brown is a 78 year old, presenting for the following:  Physical (AWV. Pt is fasting. )        5/28/2024     6:55 AM   Additional Questions   Roomed by Gilma COLBERT LPN   Accompanied by wife Lindsey       Via the Health Maintenance questionnaire, the patient has reported the following services have been completed -Eye Exam: Dr. Boo, [place], [place] 2024-04-29, this information has been sent to the abstraction team.  Health Care Directive  Patient has a Health Care Directive on file  Advance care planning document is on file and is current.    HPI    Staying active with walking.  Having some urinary urgency.  No dysuria, hematuria, frequency.    Diabetes has shown good control with lifestyle.    Lab Results   Component Value Date    A1C 6.4 09/26/2023    A1C 6.6 04/11/2023    A1C 6.5 05/16/2022    A1C 6.3 10/18/2021    A1C 6.7 04/15/2021     Last Eye exam done through [facility] in April.    History of CVA.  Continues with Plavix and baby aspirin along with atorvastatin 40 mg.  No signs or symptoms of abnormal bleeding.        5/23/2024   General Health   How would you rate your overall physical health? Good   Feel stress (tense, anxious, or unable to sleep) Not at all         5/23/2024   Nutrition   Diet: Diabetic         5/23/2024   Exercise   Days per week of moderate/strenous exercise 3 days   Average minutes spent exercising at this level 30 min         5/23/2024   Social Factors   Frequency of gathering with friends or relatives More than three times a week   Worry food won't last until get money to buy more No   Food not last or not have enough money for food? No   Do you have housing?  Yes   Are you worried about losing your housing? No   Lack of transportation? No   Unable to get utilities (heat,electricity)? No         5/23/2024   Fall Risk   Fallen 2 or more times in the past year? No    No   Trouble with walking or balance? Yes    Yes         5/23/2024    Activities of Daily Living- Home Safety   Needs help with the following daily activites None of the above   Safety concerns in the home None of the above         5/23/2024   Dental   Dentist two times every year? Yes         5/23/2024   Hearing Screening   Hearing concerns? (!) I NEED TO ASK PEOPLE TO SPEAK UP OR REPEAT THEMSELVES.         5/23/2024   Driving Risk Screening   Patient/family members have concerns about driving No         5/23/2024   General Alertness/Fatigue Screening   Have you been more tired than usual lately? No         5/23/2024   Urinary Incontinence Screening   Bothered by leaking urine in past 6 months Yes         5/23/2024   TB Screening   Were you born outside of the [place]? No         Today's PHQ-2 Score:       5/27/2024     6:00 PM   PHQ-2 ( 1999 Pf[employer])   Q1: Little interest or pleasure in doing things 0   Q2: Feeling down, depressed or hopeless 0   PHQ-2 Score 0   Q1: Little interest or pleasure in doing things Not at all   Q2: Feeling down, depressed or hopeless Not at all   PHQ-2 Score 0           5/23/2024   Substance Use   Alcohol more than 3/day or more than 7/wk No   Do you have a current opioid prescription? No   How severe/bad is pain from 1 to 10? 3/10   Do you use any other substances recreationally? No     Social History     Tobacco Use    Smoking status: Former     Passive exposure: Past    Smokeless tobacco: Never   Vaping Use    Vaping status: Never Used       ASCVD Risk   The ASCVD Risk score (Bryan DURAN, et al., 2019) failed to calculate for the following reasons:    The patient has a prior MI or stroke diagnosis    Reviewed and updated as needed this visit by Provider                    Past Medical History:   Diagnosis Date    Basilar artery thrombosis     Cancer (H)     SKIN     Dyslipidemia     Hyperlipidemia     CB (obstructive sleep apnea)     Sleep apnea     Stroke (H)     HERE WITH tia SYMPTOMS    Vertebral artery stenosis      Past Surgical History:    Procedure Laterality Date    HC KNEE SCOPE, DIAGNOSTIC      Description: Arthroscopy Knee Left;  Recorded: 02/18/2009;    HC KNEE SCOPE, DIAGNOSTIC      Description: Arthroscopy Knee Left;  Proc Date: 08/02/2007;    IR CEREBRAL ANGIOGRAM  11/5/2015    IR CEREBRAL ANGIOGRAM  6/20/2016    IR VENOUS STENT  12/28/2015    JOINT REPLACEMENT      KNEE SURGERY Bilateral     OTHER SURGICAL HISTORY Right 10/14/2015    partial meniscectomy knee. [facility]  11/4/2015         THROMBECTOMY      Z TOTAL KNEE ARTHROPLASTY Left     Description: Total Knee Arthroplasty;  Proc Date: 02/24/2009;  Comments: Dr. Richi Dos Santos at [facility]     Current providers sharing in care for this patient include:  Patient Care Team:  Francis Wren NP as PCP - General  Francis Wren NP as Assigned PCP    The following health maintenance items are reviewed in Epic and correct as of today:  Health Maintenance   Topic Date Due    RSV VACCINE (Pregnancy & 60+) (1 - 1-dose 60+ series) Never done    DTAP/TDAP/TD IMMUNIZATION (2 - Td or Tdap) 09/18/2022    ANNUAL REVIEW OF HM ORDERS  10/18/2022    COVID-19 Vaccine (6 - 2023-24 season) 09/01/2023    A1C  03/26/2024    EYE EXAM  04/04/2024    MEDICARE ANNUAL WELLNESS VISIT  04/11/2024    LIPID  04/11/2024    MICROALBUMIN  04/11/2024    DIABETIC FOOT EXAM  04/11/2024    INFLUENZA VACCINE (Season Ended) 09/01/2024    BMP  09/26/2024    FALL RISK ASSESSMENT  05/28/2025    ADVANCE CARE PLANNING  04/11/2028    COLORECTAL CANCER SCREENING  05/07/2031    HEPATITIS C SCREENING  Completed    PHQ-2 (once per calendar year)  Completed    Pneumococcal Vaccine: 65+ Years  Completed    ZOSTER IMMUNIZATION  Completed    IPV IMMUNIZATION  Aged Out    HPV IMMUNIZATION  Aged Out    MENINGITIS IMMUNIZATION  Aged Out    RSV MONOCLONAL ANTIBODY  Aged Out    LUNG CANCER SCREENING  Discontinued         Review of Systems  Constitutional, HEENT, cardiovascular, pulmonary, gi  "and gu systems are negative, except as otherwise noted.     Objective    Exam  /60   Pulse 65   Temp 97.7  F (36.5  C) (Oral)   Ht 1.778 m (5' 10\")   Wt 97.5 kg (215 lb)   SpO2 96%   BMI 30.85 kg/m     Estimated body mass index is 30.85 kg/m  as calculated from the following:    Height as of this encounter: 1.778 m (5' 10\").    Weight as of this encounter: 97.5 kg (215 lb).    Physical Exam  GENERAL: alert and no distress  EYES: Eyes grossly normal to inspection, PERRL and conjunctivae and sclerae normal  HENT: ear canals and TM's normal, nose and mouth without ulcers or lesions  NECK: no adenopathy, no asymmetry, masses, or scars  RESP: lungs clear to auscultation - no rales, rhonchi or wheezes  CV: regular rate and rhythm, normal S1 S2, no S3 or S4, no murmur, click or rub, no peripheral edema  ABDOMEN: soft, nontender, no hepatosplenomegaly, no masses and bowel sounds normal  MS: no gross musculoskeletal defects noted, no edema  SKIN: no suspicious lesions or rashes  NEURO: Normal strength and tone, mentation intact and speech normal  PSYCH: mentation appears normal, affect normal/bright        5/28/2024   Mini Cog   Clock Draw Score 2 Normal   3 Item Recall 1 object recalled   Mini Cog Total Score 3              Signed Electronically by: Francis Wren NP    "

## 2024-05-28 NOTE — PATIENT INSTRUCTIONS
"Updating lab work today.    I highlighted the scheduling number for the colonoscopy.    Preventive Care Advice   This is general advice we often give to help people stay healthy. Your care team may have specific advice just for you. Please talk to your care team about your own preventive care needs.  Lifestyle  Exercise at least 150 minutes each week (30 minutes a day, 5 days a week).  Do muscle strengthening activities 2 days a week. These help control your weight and prevent disease.  No smoking.  Wear sunscreen to prevent skin cancer.  Have your home tested for radon every 2 to 5 years. Radon is a colorless, odorless gas that can harm your lungs. To learn more, go to www.health.[facility].mn.us and search for \"Radon in Homes.\"  Keep guns unloaded and locked up in a safe place like a safe or gun vault, or, use a gun lock and hide the keys. Always lock away bullets separately. To learn more, visit [employer].[place].gov and search for \"safe gun storage.\"  Nutrition  Eat 5 or more servings of fruits and vegetables each day.  Try wheat bread, brown rice and whole grain pasta (instead of white bread, rice, and pasta).  Get enough calcium and vitamin D. Check the label on foods and aim for 100% of the RDA (recommended daily allowance).  Regular exams  Have a dental exam and cleaning every 6 months.  See your health care team every year to talk about:  Any changes in your health.  Any medicines your care team has prescribed.  Preventive care, family planning, and ways to prevent chronic diseases.  Shots (vaccines)   HPV shots (up to age 26), if you've never had them before.  Hepatitis B shots (up to age 59), if you've never had them before.  COVID-19 shot: Get this shot when it's due.  Flu shot: Get a flu shot every year.  Tetanus shot: Get a tetanus shot every 10 years.  Pneumococcal, hepatitis A, and RSV shots: Ask your care team if you need these based on your risk.  Shingles shot (for age 50 and up).  General health tests  Diabetes " screening:  Starting at age 35, Get screened for diabetes at least every 3 years.  If you are younger than age 35, ask your care team if you should be screened for diabetes.  Cholesterol test: At age 39, start having a cholesterol test every 5 years, or more often if advised.  Bone density scan (DEXA): At age 50, ask your care team if you should have this scan for osteoporosis (brittle bones).  Hepatitis C: Get tested at least once in your life.  Abdominal aortic aneurysm screening: Talk to your doctor about having this screening if you:  Have ever smoked; and  Are biologically male; and  Are between the ages of 65 and 75.  STIs (sexually transmitted infections)  Before age 24: Ask your care team if you should be screened for STIs.  After age 24: Get screened for STIs if you're at risk. You are at risk for STIs (including HIV) if:  You are sexually active with more than one person.  You don't use condoms every time.  You or a partner was diagnosed with a sexually transmitted infection.  If you are at risk for HIV, ask about PrEP medicine to prevent HIV.  Get tested for HIV at least once in your life, whether you are at risk for HIV or not.  Cancer screening tests  Cervical cancer screening: If you have a cervix, begin getting regular cervical cancer screening tests at age 21. Most people who have regular screenings with normal results can stop after age 65. Talk about this with your provider.  Breast cancer scan (mammogram): If you've ever had breasts, begin having regular mammograms starting at age 40. This is a scan to check for breast cancer.  Colon cancer screening: It is important to start screening for colon cancer at age 45.  Have a colonoscopy test every 10 years (or more often if you're at risk) Or, ask your provider about stool tests like a FIT test every year or Cologuard test every 3 years.  To learn more about your testing options, visit: www.M.dot.BaseKit/772674.pdf.  For help making a decision, visit:  gabi/xz90521.  Prostate cancer screening test: If you have a prostate and are age 55 to 69, ask your provider if you would benefit from a yearly prostate cancer screening test.  Lung cancer screening: If you are a current or former smoker age 50 to 80, ask your care team if ongoing lung cancer screenings are right for you.  For informational purposes only. Not to replace the advice of your health care provider. Copyright   2023 [facility]. All rights reserved. Clinically reviewed by the  [employer]. [employer] [id number] - REV 04/24.    Preventing Falls: Care Instructions  Injuries and health problems such as trouble walking or poor eyesight can increase your risk of falling. So can some medicines. But there are things you can do to help prevent falls. You can exercise to get stronger. You can also arrange your home to make it safer.    Talk to your doctor about the medicines you take. Ask if any of them increase the risk of falls and whether they can be changed or stopped.   Try to exercise regularly. It can help improve your strength and balance. This can help lower your risk of falling.     Practice fall safety and prevention.    Wear low-heeled shoes that fit well and give your feet good support. Talk to your doctor if you have foot problems that make this hard.  Carry a cellphone or wear a medical alert device that you can use to call for help.  Use stepladders instead of chairs to reach high objects. Don't climb if you're at risk for falls. Ask for help, if needed.  Wear the correct eyeglasses, if you need them.    Make your home safer.    Remove rugs, cords, clutter, and furniture from walkways.  Keep your house well lit. Use night-lights in hallways and bathrooms.  Install and use sturdy handrails on stairways.  Wear nonskid footwear, even inside. Don't walk barefoot or in socks without shoes.    Be safe outside.    Use handrails, curb cuts, and ramps whenever  "possible.  Keep your hands free by using a shoulder bag or backpack.  Try to walk in well-lit areas. Watch out for uneven ground, changes in pavement, and debris.  Be careful in the winter. Walk on the grass or gravel when sidewalks are slippery. Use de-icer on steps and walkways. Add non-slip devices to shoes.    Put grab bars and nonskid mats in your shower or tub and near the toilet. Try to use a shower chair or bath bench when bathing.   Get into a tub or shower by putting in your weaker leg first. Get out with your strong side first. Have a phone or medical alert device in the bathroom with you.   Where can you learn more?  Go to https://www.Legend3D.iTwin/patiented  Enter G117 in the search box to learn more about \"Preventing Falls: Care Instructions.\"  Current as of: July 17, 2023               Content Version: 14.0    2797-5854 [employer].   Care instructions adapted under license by your healthcare professional. If you have questions about a medical condition or this instruction, always ask your healthcare professional. [employer] disclaims any warranty or liability for your use of this information.      Hearing Loss: Care Instructions  Overview     Hearing loss is a sudden or slow decrease in how well you hear. It can range from slight to profound. Permanent hearing loss can occur with aging. It also can happen when you are exposed long-term to loud noise. Examples include listening to loud music, riding motorcycles, or being around other loud machines.  Hearing loss can affect your work and home life. It can make you feel lonely or depressed. You may feel that you have lost your independence. But hearing aids and other devices can help you hear better and feel connected to others.  Follow-up care is a key part of your treatment and safety. Be sure to make and go to all appointments, and call your doctor if you are having problems. It's also a good idea to know your test results " and keep a list of the medicines you take.  How can you care for yourself at home?  Avoid loud noises whenever possible. This helps keep your hearing from getting worse.  Always wear hearing protection around loud noises.  Wear a hearing aid as directed.  A professional can help you pick a hearing aid that will work best for you.  You can also get hearing aids over the counter for mild to moderate hearing loss.  Have hearing tests as your doctor suggests. They can show whether your hearing has changed. Your hearing aid may need to be adjusted.  Use other devices as needed. These may include:  Telephone amplifiers and hearing aids that can connect to a television, stereo, radio, or microphone.  Devices that use lights or vibrations. These alert you to the doorbell, a ringing telephone, or a baby monitor.  Television closed-captioning. This shows the words at the bottom of the screen. Most new TVs can do this.  TTY (text telephone). This lets you type messages back and forth on the telephone instead of talking or listening. These devices are also called TDD. When messages are typed on the keyboard, they are sent over the phone line to a receiving TTY. The message is shown on a monitor.  Use text messaging, social media, and email if it is hard for you to communicate by telephone.  Try to learn a listening technique called speechreading. It is not lipreading. You pay attention to people's gestures, expressions, posture, and tone of voice. These clues can help you understand what a person is saying. Face the person you are talking to, and have them face you. Make sure the lighting is good. You need to see the other person's face clearly.  Think about counseling if you need help to adjust to your hearing loss.  When should you call for help?  Watch closely for changes in your health, and be sure to contact your doctor if:    You think your hearing is getting worse.     You have new symptoms, such as dizziness or nausea.  "  Where can you learn more?  Go to https://www.Gloss48.net/patiented  Enter R798 in the search box to learn more about \"Hearing Loss: Care Instructions.\"  Current as of: September 27, 2023               Content Version: 14.0    0411-4488 [employer]bottle[employer].   Care instructions adapted under license by your healthcare professional. If you have questions about a medical condition or this instruction, always ask your healthcare professional. [employer]ott[employer] disclaims any warranty or liability for your use of this information.      Bladder Training: Care Instructions  Your Care Instructions     Bladder training is used to treat urge incontinence and stress incontinence. Urge incontinence means that the need to urinate comes on so fast that you can't get to a toilet in time. Stress incontinence means that you leak urine because of pressure on your bladder. For example, it may happen when you laugh, cough, or lift something heavy.  Bladder training can increase how long you can wait before you have to urinate. It can also help your bladder hold more urine. And it can give you better control over the urge to urinate.  It is important to remember that bladder training takes a few weeks to a few months to make a difference. You may not see results right away, but don't give up.  Follow-up care is a key part of your treatment and safety. Be sure to make and go to all appointments, and call your doctor if you are having problems. It's also a good idea to know your test results and keep a list of the medicines you take.  How can you care for yourself at home?  Work with your doctor to come up with a bladder training program that is right for you. You may use one or more of the following methods.  Delayed urination  In the beginning, try to keep from urinating for 5 minutes after you first feel the need to go.  While you wait, take deep, slow breaths to relax. Kegel exercises can also help you delay the need to " "go to the bathroom.  After some practice, when you can easily wait 5 minutes to urinate, try to wait 10 minutes before you urinate.  Slowly increase the waiting period until you are able to control when you have to urinate.  Scheduled urination  Empty your bladder when you first wake up in the morning.  Schedule times throughout the day when you will urinate.  Start by going to the bathroom every hour, even if you don't need to go.  Slowly increase the time between trips to the bathroom.  When you have found a schedule that works well for you, keep doing it.  If you wake up during the night and have to urinate, do it. Apply your schedule to waking hours only.  Kegel exercises  These tighten and strengthen pelvic muscles, which can help you control the flow of urine. (If doing these exercises causes pain, stop doing them and talk with your doctor.) To do Kegel exercises:  Squeeze your muscles as if you were trying not to pass gas. Or squeeze your muscles as if you were stopping the flow of urine. Your belly, legs, and buttocks shouldn't move.  Hold the squeeze for 3 seconds, then relax for 5 to 10 seconds.  Start with 3 seconds, then add 1 second each week until you are able to squeeze for 10 seconds.  Repeat the exercise 10 times a session. Do 3 to 8 sessions a day.  When should you call for help?  Watch closely for changes in your health, and be sure to contact your doctor if:    Your incontinence is getting worse.     You do not get better as expected.   Where can you learn more?  Go to https://www.CohesiveFT.net/patiented  Enter V684 in the search box to learn more about \"Bladder Training: Care Instructions.\"  Current as of: November 15, 2023               Content Version: 14.0    9553-6291 [employer].   Care instructions adapted under license by your healthcare professional. If you have questions about a medical condition or this instruction, always ask your healthcare professional. [employer] " Incorporated disclaims any warranty or liability for your use of this information.

## 2024-05-29 LAB — BACTERIA UR CULT: NORMAL

## 2024-06-28 ASSESSMENT — SLEEP AND FATIGUE QUESTIONNAIRES
HOW LIKELY ARE YOU TO NOD OFF OR FALL ASLEEP WHILE LYING DOWN TO REST IN THE AFTERNOON WHEN CIRCUMSTANCES PERMIT: HIGH CHANCE OF DOZING
HOW LIKELY ARE YOU TO NOD OFF OR FALL ASLEEP WHILE SITTING QUIETLY AFTER LUNCH WITHOUT ALCOHOL: SLIGHT CHANCE OF DOZING
HOW LIKELY ARE YOU TO NOD OFF OR FALL ASLEEP WHILE WATCHING TV: SLIGHT CHANCE OF DOZING
HOW LIKELY ARE YOU TO NOD OFF OR FALL ASLEEP IN A CAR, WHILE STOPPED FOR A FEW MINUTES IN TRAFFIC: WOULD NEVER DOZE
HOW LIKELY ARE YOU TO NOD OFF OR FALL ASLEEP WHEN YOU ARE A PASSENGER IN A CAR FOR AN HOUR WITHOUT A BREAK: WOULD NEVER DOZE
HOW LIKELY ARE YOU TO NOD OFF OR FALL ASLEEP WHILE SITTING AND READING: SLIGHT CHANCE OF DOZING
HOW LIKELY ARE YOU TO NOD OFF OR FALL ASLEEP WHILE SITTING INACTIVE IN A PUBLIC PLACE: WOULD NEVER DOZE
HOW LIKELY ARE YOU TO NOD OFF OR FALL ASLEEP WHILE SITTING AND TALKING TO SOMEONE: WOULD NEVER DOZE

## 2024-07-03 ENCOUNTER — OFFICE VISIT (OUTPATIENT)
Dept: SLEEP MEDICINE | Facility: CLINIC | Age: 79
End: 2024-07-03
Payer: MEDICARE

## 2024-07-03 VITALS
DIASTOLIC BLOOD PRESSURE: 63 MMHG | HEART RATE: 66 BPM | SYSTOLIC BLOOD PRESSURE: 124 MMHG | BODY MASS INDEX: 31.07 KG/M2 | OXYGEN SATURATION: 98 % | WEIGHT: 217 LBS | HEIGHT: 70 IN

## 2024-07-03 DIAGNOSIS — G47.52 RBD (REM BEHAVIORAL DISORDER): ICD-10-CM

## 2024-07-03 DIAGNOSIS — G47.33 OSA ON CPAP: Primary | ICD-10-CM

## 2024-07-03 PROCEDURE — 99203 OFFICE O/P NEW LOW 30 MIN: CPT | Performed by: STUDENT IN AN ORGANIZED HEALTH CARE EDUCATION/TRAINING PROGRAM

## 2024-07-03 NOTE — ASSESSMENT & PLAN NOTE
Unclear if patient is experiencing 0 RBD 2/2 increase in residual AHI while on CPAP therapy versus true RBD.  Onset of symptoms over the last 2 years.  2 years ago residual AHI was less than 5 and is currently 10.  Recommend better safety  Recommend tighter control of sleep apnea with switching from fixed CPAP to auto CPAP

## 2024-07-03 NOTE — PATIENT INSTRUCTIONS
MY INFORMATION ON SLEEP APNEA-  Kevin Webb    DOCTOR : Shelby Barron MD  SLEEP CENTER :      MY CONTACT NUMBER:    [facility]  [phone]  [facility]      For general sleep health questions:   http://sleepeducation.org    For tips about PAP and COVID-19:  https://www.thoracic.org/patients/patient-resources/resources/covid-19-and-home-pap-therapy.pdf    For general info about COVID-19 including vaccines:  https://[email or web address].org/covid19      Continue PAP therapy every night, for all hours that you are sleeping (including naps.)  As always, try to get at least 8 hours of sleep or more each day, keep a regular sleep schedule, and avoid sleep deprivation. Avoid alcohol.  Reasons that you might need a change to your pressure therapy would be weight gain or loss, waking having inadvertently removed your PAP overnight, having previously felt refreshed by sleep with CPAP use and now waking un-refreshed, and return of daytime sleepiness. Also, the development of new medical problems  (such as heart failure, stroke, medications such as narcotics) can sometimes affect breathing at night and change your PAP therapy needs.  Please bring PAP with you if you are hospitalized.  If anticipating surgery be sure to discuss with your surgeon that you have sleep apnea and use PAP therapy.    Maintain your equipment as recommended which includes routine cleaning and replacement of supplies.      Call DME for any questions regarding supplies or maintenance.    [facility], [facility] [phone]    Do not drive on engage in potentially dangerous activities if feeling sleepy.  Please follow up in sleep clinic again in 3 months.        Tips for your PAP use-    Mask fitting tips  Mask fitting exercise:    To improve your mask seal and your mobility at night, put mask on and secure in place.  Lie down in bed with full pressure and  roll to one side, adjust headgear while in that position to eliminate any leaks. Repeat process rolling to other side.     The mask seal does not have to be perfect:   CPAP machines are designed to make up for small leaks. However, you will not tolerate leaks blowing in your eyes so you will need to adjust.   Any leak should only be near or at the bottom of the mask.  We expect your mask to leak slightly at night.    Do not over-tighten the headgear straps, tighter IS NOT better, we expect minimal leak.    First try re-positioning the mask or headgear before tightening the headgear straps.  Mask leaks are expected due to changing sleeping positions. Try pulling the mask away from your skin allowing the cushion to re-inflate will minimize the leak.  If you struggle for a good fit, try turning the CPAP off and then readjust the mask by pulling it away from your face and then turning back on the CPAP.        Humidifier tips  Humidifiers can be adjusted to increase or decrease the amount of moisture according to your comfort level. You may need to adjust this frequently at first, but then might only change it with seasonal weather changes.     Try INCREASING the humidity if:  You experience a dry, irritated nasal passage or throat.  You have a runny, drippy nose or sneezing fits after using CPAP.  You experience nasal congestion during or after CPAP use.    Try DECREASING the humidity if:  You have excessive condensation or  rain out  in the tubing or mask.  Otherwise keep the tubing warm during the night by running it underneath the blankets or pillow.      Clinic visit after initial PAP set-up   Bring your equipment with you to your 5-8 week follow up clinic visit.  We will be extracting your data from the machine if not available from the cloud based [employer].        Travel  Always take your equipment with you when you travel.  If you fly with your equipment bring it on with you as a carry on.  Medical equipment does  not count as a carry on.    If you travel international the machines take 110-240v.  The only adapter needed is the adapter that will fit into the receptacle (outlet).    You may also want to bring an extension cord as many hotel rooms have limited outlets at the bedside.  Do not travel with water in your humidifier chamber.     Cleaning and Maintenance Guidelines    Equipment Frequency Cleaning Method   Mask First Day    Daily      Weekly Soak mask in hot soapy water for 30 minutes, rinse and air dry.  Wipe nasal cushion with a hot soapy (Ivory, baby shampoo) cloth and rinse.  Baby wipes may also be used.  Do not use anti-bacterial soaps,Carie  liquid soap, rubbing alcohol, bleach or ammonia.  Wash frame in hot soapy water (Ivory, baby shampoo) rinse and let air dry   Headgear Biweekly Wash in hot soapy water, rinse and air dry   Reusable Gray Filter Weekly Wash in hot soapy water, rinse, put in towel squeeze moisture out, let air dry   Disposable White Filter Check Weekly Replace when brown or gray in color; at least every 2 to 3 months   Humidifier Chamber Daily    Weekly Empty distilled water from humidifier and let air dry    Hand wash in hot soapy water, rinse and air dry   Tubing Weekly Wash in hot soapy water, rinse and let air dry   Mask, Tubing and Humidifier Chamber As needed Disinfect: Soak in 1 part distilled white vinegar to 3 parts hot water for 30 minutes, rinse well and air dry  Not the material headgear        MASK AND SUPPLY REORDERING and EQUIPMENT NEEDS through your DME and per your insurance  Reminder: Most insurance companies will allow for a new mask, headgear, tubing, and reusable gray filter every six months.  Disposable white ultra-fine filters are covered monthly.      HOME AND SAFETY INSTRUCTIONS  Do not use frayed or cracked electrical cords, multi plug adaptors, or switched receptacles  Do not immerse electrical equipment into water  Assure that electrical cords do not become a tripping  hazard

## 2024-07-03 NOTE — ASSESSMENT & PLAN NOTE
Kevin Webb has Moderate Sleep apnea. He is tolerating PAP well and reports adequate compliance with PAP therapy. Daytime symptoms are improved and reports positive benefits with PAP use.   CB is not adequately controlled with Auto CPAP at the current settings per compliance DL. As residual AHI has increased by ~ 7 over 2 years to 10.  Recommend changing from CPAP 10 cm H2O to auto CPAP 10-15 cm H2O  Prescription provided for renewal of PAP supplies.  Recommended him/her to continue using the CPAP regularly during sleep and instructed  to get  the supplies for the PAP replaced regularly.    Patient instructed to remember to bring PAP with him/her if hospitalized and if anticipating procedure that requires sedation/surgery to be sure to discuss with the provider/surgeon that he/she has sleep apnea and uses PAP therapy.

## 2024-07-03 NOTE — PROGRESS NOTES
[facility]     Sleep clinic follow up visit note    Date on this visit: 7/3/2024    Primary Physician: Francsi Wren     History of present illness:  Kevin Webb is a 78 year old male patient with CB on CPAP, T2DM, HLD, Hx of CVA who presents for CPAP Follow Up  . He has moderate sleep apnea with an AHI of 24, managed with CPAP.     Do you use a CPAP Machine at home: Yes  DME: [employer]  Not in airview: message sent to Daniel to get compliance report  PSG: [facility] 11/7/11 ( media tab) copy sent to Dr. Mace 7/29/2020 to scan into chart.   AHI: 23.6  DME: [facility]  PSG: [facility] 11/7/11 (media tab)  AHI: 23.6  Overall, on a scale of 0-10 how would you rate your CPAP (0 poor, 10 great): 10    What type of mask do you use: Nasal Pillow  Is your mask comfortable: Yes  If not, why:      Is your mask leaking: No  If yes, where do you feel it:    How many night per week does the mask leak (0-7):      Do you notice snoring with mask on: No  Do you notice gasping arousals with mask on: No  Are you having significant oral or nasal dryness: No  Is the pressure setting comfortable: Yes  If not, why:      What is your typical bedtime: 9:30 p.m.  How long does it take you to go to sleep on PAP therapy: 15-30 minutes  What time do you typically get out of bed for the day: 6:45 a.m.  How many hours on average per night are you using PAP therapy: All night  How many hours are you sleeping per night: 8-9 hours a night  Do you feel well rested in the morning: Yes    [employer]ics  CPAP 10 cmH2O 30 day usage data:  100% of days with > 4 hours of use. 0/30 days with no use.   Average use 9 hours 29 minutes per day.   AHI 9.9 events per hour.       Assessment and Plan:  Problem List Items Addressed This Visit       CB on CPAP - Primary     Kevin Webb has Moderate Sleep apnea. He is tolerating PAP well and reports adequate compliance with PAP therapy. Daytime symptoms are improved  and reports positive benefits with PAP use.   CB is not adequately controlled with Auto CPAP at the current settings per compliance DL. As residual AHI has increased by ~ 7 over 2 years to 10.  Recommend changing from CPAP 10 cm H2O to auto CPAP 10-15 cm H2O  Prescription provided for renewal of PAP supplies.  Recommended him/her to continue using the CPAP regularly during sleep and instructed  to get  the supplies for the PAP replaced regularly.    Patient instructed to remember to bring PAP with him/her if hospitalized and if anticipating procedure that requires sedation/surgery to be sure to discuss with the provider/surgeon that he/she has sleep apnea and uses PAP therapy.           Relevant Orders    Comprehensive DME (Completed)    RBD (REM behavioral disorder)     Unclear if patient is experiencing 0 RBD 2/2 increase in residual AHI while on CPAP therapy versus true RBD.  Onset of symptoms over the last 2 years.  2 years ago residual AHI was less than 5 and is currently 10.  Recommend better safety  Recommend tighter control of sleep apnea with switching from fixed CPAP to auto CPAP              SCALES:  EPWORTH SLEEPINESS SCALE       6/28/2024    11:12 AM    [place] Sleepiness Scale ( [place]  4420-9107<br>ESS - [place]/English - Final version - 21 Nov 07 - [facility] [place] [employer].)   Sitting and reading Slight chance of dozing   Watching TV Slight chance of dozing   Sitting, inactive in a public place (e.g. a theatre or a meeting) Would never doze   As a passenger in a car for an hour without a break Would never doze   Lying down to rest in the afternoon when circumstances permit High chance of dozing   Sitting and talking to someone Would never doze   Sitting quietly after a lunch without alcohol Slight chance of dozing   In a car, while stopped for a few minutes in traffic Would never doze   [place] Score (MC) 6   [place] Score (Sleep) 6       INSOMNIA SEVERITY INDEX (NOE)        6/28/2024    11:03 AM    Insomnia Severity Index (NOE)   Difficulty falling asleep 0   Difficulty staying asleep 0   Problems waking up too early 0   How SATISFIED/DISSATISFIED are you with your CURRENT sleep pattern? 1   How NOTICEABLE to others do you think your sleep problem is in terms of impairing the quality of your life? 0   How WORRIED/DISTRESSED are you about your current sleep problem? 0   To what extent do you consider your sleep problem to INTERFERE with your daily functioning (e.g. daytime fatigue, mood, ability to function at work/daily chores, concentration, memory, mood, etc.) CURRENTLY? 0   NOE Total Score 1     Guidelines for Scoring/Interpretation:  Total score categories:  0-7 = No clinically significant insomnia   8-14 = Subthreshold insomnia   15-21 = Clinical insomnia (moderate severity)  22-28 = Clinical insomnia (severe)  Used via courtesy of www.[employer].va.gov with permission from Dany Ca PhD., [facility]      Allergies:    No Known Allergies    Medications:    Current Outpatient Medications   Medication Sig Dispense Refill    aspirin 81 MG EC tablet Take 81 mg by mouth      atorvastatin (LIPITOR) 40 MG tablet Take 1 tablet (40 mg) by mouth at bedtime 90 tablet 3    B-D ULTRA-FINE 33 LANCETS MISC 1 strip      blood glucose (ONETOUCH ULTRA) test strip USE ONCE DAILY E11.9 100 strip PRN    Calcium Carbonate-Vit D-Min (CALCIUM 1200 PO) Take 1,200 mg by mouth daily      clopidogrel (PLAVIX) 75 MG tablet Take 1 tablet (75 mg) by mouth daily 90 tablet 3    Multiple Vitamins-Minerals (AIRBORNE GUMMIES PO)       multivitamin (CENTRUM SILVER) tablet Take 1 tablet by mouth daily         Problem List:  Patient Active Problem List    Diagnosis Date Noted    RBD (REM behavioral disorder) 07/03/2024     Priority: Medium    Anemia, unspecified type 05/17/2022     Priority: Medium    Hx of adenomatous polyp of colon 10/19/2021     Priority: Medium     Last colonoscopy May 2021 with 3 adenomatous polyps and  recommendation for 3-year follow-up if health allows.      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 (H) 11/04/2015     Priority: Medium     Bilateral Cerebellar & right thalamic        CB on CPAP 05/28/2015     Priority: Medium    Essential Hypercholesterolemia      Priority: Medium     Created by Conversion            Past Medical/Surgical History:  Past Medical History:   Diagnosis Date    Basilar artery thrombosis     Cancer (H)     SKIN     Dyslipidemia     Hyperlipidemia     CB (obstructive sleep apnea)     Sleep apnea     Stroke (H)     HERE WITH tia SYMPTOMS    Vertebral artery stenosis      Past Surgical History:   Procedure Laterality Date     KNEE SCOPE, DIAGNOSTIC      Description: Arthroscopy Knee Left;  Recorded: 02/18/2009;     KNEE SCOPE, DIAGNOSTIC      Description: Arthroscopy Knee Left;  Proc Date: 08/02/2007;    IR CEREBRAL ANGIOGRAM  11/5/2015    IR CEREBRAL ANGIOGRAM  6/20/2016    IR VENOUS STENT  12/28/2015    JOINT REPLACEMENT      KNEE SURGERY Bilateral     OTHER SURGICAL HISTORY Right 10/14/2015    [facility] knee[facility]. [facility]on[facility]  11/4/2015         THROMBECTOMY      Z TOTAL KNEE ARTHROPLASTY Left     Description: Total Knee Arthroplasty;  Proc Date: 02/24/2009;  Comments: Dr. Richi Dos Santos at [facility]       Social History:  Social History     Socioeconomic History    Marital status:      Spouse name: Not on file    Number of children: Not on file    Years of education: Not on file    Highest education level: Not on file   Occupational History    Not on file   Tobacco Use    Smoking status:  Former     Passive exposure: Past    Smokeless tobacco: Never   Vaping Use    Vaping status: Never Used   Substance and Sexual Activity    Alcohol use: Not on file    Drug use: Not on file    Sexual activity: Not on file   Other Topics Concern    Not on file   Social History Narrative    Not on file     Social Determinants of Health     Financial Resource Strain: Low Risk  (5/23/2024)    Financial Resource Strain     Within the past 12 months, have you or your family members you live with been unable to get utilities (heat, electricity) when it was really needed?: No   Food Insecurity: Low Risk  (5/23/2024)    Food Insecurity     Within the past 12 months, did you worry that your food would run out before you got money to buy more?: No     Within the past 12 months, did the food you bought just not last and you didn t have money to get more?: No   Transportation Needs: Low Risk  (5/23/2024)    Transportation Needs     Within the past 12 months, has lack of transportation kept you from medical appointments, getting your medicines, non-medical meetings or appointments, work, or from getting things that you need?: No   Physical Activity: Insufficiently Active (5/23/2024)    Exercise Vital Sign     Days of Exercise per Week: 3 days     Minutes of Exercise per Session: 30 min   Stress: No Stress Concern Present (5/23/2024)    [place] [employer] - Occupational Stress Questionnaire     Feeling of Stress : Not at all   Social Connections: Unknown (5/23/2024)    Social Connection and Isolation Panel [NHANES]     Frequency of Communication with Friends and Family: Not on file     Frequency of Social Gatherings with Friends and Family: More than three times a week     Attends [employer] Services: Not on file     Active Member of Clubs or Organizations: Not on file     Attends Club or Organization Meetings: Not on file     Marital Status: Not on file   Interpersonal Safety: Not on file   Housing Stability:  "Low Risk  (5/23/2024)    Housing Stability     Do you have housing? : Yes     Are you worried about losing your housing?: No       Family History:  Family History   Problem Relation Age of Onset    Snoring Father     Breast Cancer Mother     Heart Disease Father     Diabetes Father        Review of systems  A complete review of systems reviewed by me is negative with the exeption of what has been mentioned in the history of present illness.    Physical Examination:  Vitals: /63   Pulse 66   Ht 1.778 m (5' 10\")   Wt 98.4 kg (217 lb)   SpO2 98%   BMI 31.14 kg/m    BMI= Body mass index is 31.14 kg/m .     GENERAL: alert and no distress  EYES: Eyes grossly normal to inspection.  No discharge or erythema, or obvious scleral/conjunctival abnormalities.  RESP: No audible wheeze, cough, or visible cyanosis.    SKIN: Visible skin clear. No significant rash, abnormal pigmentation or lesions.  NEURO: Cranial nerves grossly intact.  Mentation and speech appropriate for age.  PSYCH: Appropriate affect, tone, and pace of words          Other tests/labs:   I have reviewed the labs and personally reviewed the imaging below and made my comment in the assessment and plan.        Patient was strongly advised to avoid driving, operating any heavy machinery or other hazardous situations while drowsy or sleepy.  Patient was counseled on the importance of driving while alert, to pull over if drowsy, or nap before getting into the vehicle if sleepy.      Plan is for Kevin Webb to follow up in about 3 month(s).     The above note was dictated using voice recognition software. Although reviewed after completion, some word and grammatical error may remain . Please contact the author for any clarifications.    Total time spent reviewing medical records, history and physical examination, review of previous testing and interpretation as well as documentation on this date, 07/03/24: 33 minutes    Shelby Barron MD on " 10/20/2022   [facility]   [address], [address]   [address] [place] [address]   Appointments: [phone]     CC: No ref. provider found

## 2024-07-11 ENCOUNTER — TRANSFERRED RECORDS (OUTPATIENT)
Dept: HEALTH INFORMATION MANAGEMENT | Facility: CLINIC | Age: 79
End: 2024-07-11
Payer: MEDICARE

## 2024-07-22 ENCOUNTER — TRANSFERRED RECORDS (OUTPATIENT)
Dept: HEALTH INFORMATION MANAGEMENT | Facility: CLINIC | Age: 79
End: 2024-07-22
Payer: MEDICARE

## 2024-08-29 ENCOUNTER — TRANSFERRED RECORDS (OUTPATIENT)
Dept: HEALTH INFORMATION MANAGEMENT | Facility: CLINIC | Age: 79
End: 2024-08-29
Payer: MEDICARE

## 2024-09-20 ENCOUNTER — OFFICE VISIT (OUTPATIENT)
Dept: SLEEP MEDICINE | Facility: CLINIC | Age: 79
End: 2024-09-20
Payer: MEDICARE

## 2024-09-20 VITALS
DIASTOLIC BLOOD PRESSURE: 62 MMHG | OXYGEN SATURATION: 96 % | WEIGHT: 215.2 LBS | HEART RATE: 61 BPM | SYSTOLIC BLOOD PRESSURE: 119 MMHG | BODY MASS INDEX: 30.81 KG/M2 | HEIGHT: 70 IN

## 2024-09-20 DIAGNOSIS — G47.52 RBD (REM BEHAVIORAL DISORDER): ICD-10-CM

## 2024-09-20 DIAGNOSIS — G47.33 OSA ON CPAP: Primary | ICD-10-CM

## 2024-09-20 PROCEDURE — 99213 OFFICE O/P EST LOW 20 MIN: CPT | Performed by: STUDENT IN AN ORGANIZED HEALTH CARE EDUCATION/TRAINING PROGRAM

## 2024-09-20 NOTE — PATIENT INSTRUCTIONS
MY INFORMATION ON SLEEP APNEA-  Kevin Webb    DOCTOR : Shelby Barron MD  SLEEP CENTER :      MY CONTACT NUMBER:    [facility]  [phone]  [facility]      For general sleep health questions:   http://sleepeducation.org    For tips about PAP and COVID-19:  [email or web address]/patients/[email or web address]-19-[email or web address]-home-pap-therapy.pdf    For general info about COVID-19 including vaccines:  [email or web address]      Continue PAP therapy every night, for all hours that you are sleeping (including naps.)  As always, try to get at least 8 hours of sleep or more each day, keep a regular sleep schedule, and avoid sleep deprivation. Avoid alcohol.  Reasons that you might need a change to your pressure therapy would be weight gain or loss, waking having inadvertently removed your PAP overnight, having previously felt refreshed by sleep with CPAP use and now waking un-refreshed, and return of daytime sleepiness. Also, the development of new medical problems  (such as heart failure, stroke, medications such as narcotics) can sometimes affect breathing at night and change your PAP therapy needs.  Please bring PAP with you if you are hospitalized.  If anticipating surgery be sure to discuss with your surgeon that you have sleep apnea and use PAP therapy.    Maintain your equipment as recommended which includes routine cleaning and replacement of supplies.      Call DME for any questions regarding supplies or maintenance.    [facility], [facility] [phone]    Do not drive on engage in potentially dangerous activities if feeling sleepy.  Please follow up in sleep clinic again in 6 months.        Tips for your PAP use-    Mask fitting tips  Mask fitting exercise:    To improve your mask seal and your mobility at night, put mask on and secure in place.  Lie down in bed with full pressure and  roll to one side, adjust headgear while in that position to eliminate any leaks. Repeat process rolling to other side.     The mask seal does not have to be perfect:   CPAP machines are designed to make up for small leaks. However, you will not tolerate leaks blowing in your eyes so you will need to adjust.   Any leak should only be near or at the bottom of the mask.  We expect your mask to leak slightly at night.    Do not over-tighten the headgear straps, tighter IS NOT better, we expect minimal leak.    First try re-positioning the mask or headgear before tightening the headgear straps.  Mask leaks are expected due to changing sleeping positions. Try pulling the mask away from your skin allowing the cushion to re-inflate will minimize the leak.  If you struggle for a good fit, try turning the CPAP off and then readjust the mask by pulling it away from your face and then turning back on the CPAP.        Humidifier tips  Humidifiers can be adjusted to increase or decrease the amount of moisture according to your comfort level. You may need to adjust this frequently at first, but then might only change it with seasonal weather changes.     Try INCREASING the humidity if:  You experience a dry, irritated nasal passage or throat.  You have a runny, drippy nose or sneezing fits after using CPAP.  You experience nasal congestion during or after CPAP use.    Try DECREASING the humidity if:  You have excessive condensation or  rain out  in the tubing or mask.  Otherwise keep the tubing warm during the night by running it underneath the blankets or pillow.      Clinic visit after initial PAP set-up   Bring your equipment with you to your 5-8 week follow up clinic visit.  We will be extracting your data from the machine if not available from the cloud based [employer].        Travel  Always take your equipment with you when you travel.  If you fly with your equipment bring it on with you as a carry on.  Medical equipment does  not count as a carry on.    If you travel international the machines take 110-240v.  The only adapter needed is the adapter that will fit into the receptacle (outlet).    You may also want to bring an extension cord as many hotel rooms have limited outlets at the bedside.  Do not travel with water in your humidifier chamber.     Cleaning and Maintenance Guidelines    Equipment Frequency Cleaning Method   Mask First Day    Daily      Weekly Soak mask in hot soapy water for 30 minutes, rinse and air dry.  Wipe nasal cushion with a hot soapy (Ivory, baby shampoo) cloth and rinse.  Baby wipes may also be used.  Do not use anti-bacterial soaps,Carie  liquid soap, rubbing alcohol, bleach or ammonia.  Wash frame in hot soapy water (Ivory, baby shampoo) rinse and let air dry   Headgear Biweekly Wash in hot soapy water, rinse and air dry   Reusable Gray Filter Weekly Wash in hot soapy water, rinse, put in towel squeeze moisture out, let air dry   Disposable White Filter Check Weekly Replace when brown or gray in color; at least every 2 to 3 months   Humidifier Chamber Daily    Weekly Empty distilled water from humidifier and let air dry    Hand wash in hot soapy water, rinse and air dry   Tubing Weekly Wash in hot soapy water, rinse and let air dry   Mask, Tubing and Humidifier Chamber As needed Disinfect: Soak in 1 part distilled white vinegar to 3 parts hot water for 30 minutes, rinse well and air dry  Not the material headgear        MASK AND SUPPLY REORDERING and EQUIPMENT NEEDS through your DME and per your insurance  Reminder: Most insurance companies will allow for a new mask, headgear, tubing, and reusable gray filter every six months.  Disposable white ultra-fine filters are covered monthly.      HOME AND SAFETY INSTRUCTIONS  Do not use frayed or cracked electrical cords, multi plug adaptors, or switched receptacles  Do not immerse electrical equipment into water  Assure that electrical cords do not become a tripping  hazard

## 2024-09-20 NOTE — PROGRESS NOTES
[facility]     Sleep clinic follow up visit note    Date on this visit: 9/20/2024    Primary Physician: Francis Wren     History of present illness:  Kevin Webb is a 78 year old male patient with CB on CPAP, T2DM, HLD, Hx of CVA   who presents for CPAP Follow Up (CPAP follow up)  He has moderate sleep apnea with an AHI of 24, managed with CPAP.     Do you use a CPAP Machine at home: Yes  DME: [facility]  Not in airview: message sent to Daniel to get compliance report  PSG: [facility] 11/7/11 (HE media tab) copy sent to Dr. Mace 7/29/2020 to scan into chart.   AHI: 23.6  DME: [facility]  PSG: [facility] 11/7/11 (media tab)  AHI: 23.6  Overall, on a scale of 0-10 how would you rate your CPAP (0 poor, 10 great): 10    What type of mask do you use: Nasal Pillow  Is your mask comfortable: Yes  If not, why:      Is your mask leaking: No  If yes, where do you feel it:    How many night per week does the mask leak (0-7):      Do you notice snoring with mask on: No  Do you notice gasping arousals with mask on: No  Are you having significant oral or nasal dryness: No  Is the pressure setting comfortable: Yes  If not, why:      What is your typical bedtime: Between 9 and 10 p.m.  How long does it take you to go to sleep on PAP therapy: 10 min.  What time do you typically get out of bed for the day: 7 a.m.  How many hours on average per night are you using PAP therapy: 8-10  How many hours are you sleeping per night: 8-10  Do you feel well rested in the morning: Yes    Respironics  Auto-PAP 10 - 15 cmH2O 30 day usage data:    100% of days with > 4 hours of use. 0/30 days with no use.   Average use 10 hours 22 minutes per day.   Average % of night in large leak 18 min 28 secs.    CPAP 90% pressure 13 cm H2O.   AHI 7.7 events per hour.           Assessment and Plan:  Problem List Items Addressed This Visit       CB on CPAP - Primary     Kevin Webb has Moderate Sleep apnea. He is  tolerating PAP well and reports adequate compliance with PAP therapy. Daytime symptoms are improved and reports positive benefits with PAP use.   CB is adequately controlled with Auto CPAP at the current settings per compliance DL. As residual AHI has decreased by 2 but remains 7.2 with recent pressure change.Periodic breathing has decreased from 11.5% to 7.7% with recent change   Recommend changing from auto CPAP 10-15 cm H2O to CPAP 13 cm H2O(90% pressure)  Prescription provided for renewal of PAP supplies.  Recommended him/her to continue using the CPAP regularly during sleep and instructed  to get  the supplies for the PAP replaced regularly.    Patient instructed to remember to bring PAP with him/her if hospitalized and if anticipating procedure that requires sedation/surgery to be sure to discuss with the provider/surgeon that he/she has sleep apnea and uses PAP therapy.           Relevant Orders    Comprehensive DME (Completed)    RBD (REM behavioral disorder)     Unclear if patient is experiencing 0 RBD 2/2 increase in residual AHI while on CPAP therapy versus true RBD.  Onset of symptoms over the last 2 years.  2 years ago residual AHI was less than 5 and is currently 10.  Recommend better safety  Recommend tighter control of sleep apnea with switching from fixed CPAP to auto CPAP  Wife reports reduction in frequency of dream enactment behavior with improvement in sleep apnea              SCALES:  EPWORTH SLEEPINESS SCALE       9/15/2024     3:37 PM    Kersey Sleepiness Scale ( [place]  9764-5307<br>ESS - [place]/English - Final version - 21 Nov 07 - [facility] [place].)   Sitting and reading Slight chance of dozing   Watching TV Slight chance of dozing   Sitting, inactive in a public place (e.g. a theatre or a meeting) Slight chance of dozing   As a passenger in a car for an hour without a break Would never doze   Lying down to rest in the afternoon when circumstances permit High chance of dozing    Sitting and talking to someone Would never doze   Sitting quietly after a lunch without alcohol Slight chance of dozing   In a car, while stopped for a few minutes in traffic Would never doze   Broadway Score (MC) 7   Broadway Score (Sleep) 7       INSOMNIA SEVERITY INDEX (NOE)        9/15/2024     3:32 PM   Insomnia Severity Index (NOE)   Difficulty falling asleep 0   Difficulty staying asleep 0   Problems waking up too early 0   How SATISFIED/DISSATISFIED are you with your CURRENT sleep pattern? 1   How NOTICEABLE to others do you think your sleep problem is in terms of impairing the quality of your life? 0   How WORRIED/DISTRESSED are you about your current sleep problem? 0   To what extent do you consider your sleep problem to INTERFERE with your daily functioning (e.g. daytime fatigue, mood, ability to function at work/daily chores, concentration, memory, mood, etc.) CURRENTLY? 1   NOE Total Score 2     Guidelines for Scoring/Interpretation:  Total score categories:  0-7 = No clinically significant insomnia   8-14 = Subthreshold insomnia   15-21 = Clinical insomnia (moderate severity)  22-28 = Clinical insomnia (severe)  Used via courtesy of www.Kash[place]alth.va.gov with permission from Dany Ca PhD., [facility]      Allergies:    No Known Allergies    Medications:    Current Outpatient Medications   Medication Sig Dispense Refill    aspirin 81 MG EC tablet Take 81 mg by mouth      atorvastatin (LIPITOR) 40 MG tablet Take 1 tablet (40 mg) by mouth at bedtime 90 tablet 3    B-D ULTRA-FINE 33 LANCETS MISC 1 strip      blood glucose (ONETOUCH ULTRA) test strip USE ONCE DAILY E11.9 100 strip PRN    Calcium Carbonate-Vit D-Min (CALCIUM 1200 PO) Take 1,200 mg by mouth daily      clopidogrel (PLAVIX) 75 MG tablet Take 1 tablet (75 mg) by mouth daily 90 tablet 3    Multiple Vitamins-Minerals (AIRBORNE GUMMIES PO)       multivitamin (CENTRUM SILVER) tablet Take 1 tablet by mouth daily         Problem  List:  Patient Active Problem List    Diagnosis Date Noted    RBD (REM behavioral disorder) 07/03/2024     Priority: Medium    Anemia, unspecified type 05/17/2022     Priority: Medium    Hx of adenomatous polyp of colon 10/19/2021     Priority: Medium     Last colonoscopy May 2021 with 3 adenomatous polyps and recommendation for 3-year follow-up if health allows.      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 (H) 11/04/2015     Priority: Medium     Bilateral Cerebellar & right thalamic        CB on CPAP 05/28/2015     Priority: Medium    Essential Hypercholesterolemia      Priority: Medium     Created by Conversion            Past Medical/Surgical History:  Past Medical History:   Diagnosis Date    Basilar artery thrombosis     Cancer (H)     SKIN     Dyslipidemia     Hyperlipidemia     CB (obstructive sleep apnea)     Sleep apnea     Stroke (H)     HERE WITH tia SYMPTOMS    Vertebral artery stenosis      Past Surgical History:   Procedure Laterality Date     KNEE SCOPE, DIAGNOSTIC      Description: Arthroscopy Knee Left;  Recorded: 02/18/2009;     KNEE SCOPE, DIAGNOSTIC      Description: Arthroscopy Knee Left;  Proc Date: 08/02/2007;    IR CEREBRAL ANGIOGRAM  11/5/2015    IR CEREBRAL ANGIOGRAM  6/20/2016    IR VENOUS STENT  12/28/2015    JOINT REPLACEMENT      KNEE SURGERY Bilateral     OTHER SURGICAL HISTORY Right 10/14/2015    [facility]  11/4/2015         THROMBECTOMY      [email or web address] TOTAL KNEE ARTHROPLASTY Left     Description: Total Knee Arthroplasty;  Proc Date: 02/24/2009;  Comments: Dr. Richi Dos Santos at [facility]  Hospital       Social History:  Social History     Socioeconomic History    Marital status:      Spouse name: Not on file    Number of children: Not on file    Years of education: Not on file    Highest education level: Not on file   Occupational History    Not on file   Tobacco Use    Smoking status: Former     Passive exposure: Past    Smokeless tobacco: Never   Vaping Use    Vaping status: Never Used   Substance and Sexual Activity    Alcohol use: Not on file    Drug use: Not on file    Sexual activity: Not on file   Other Topics Concern    Not on file   Social History Narrative    Not on file     Social Determinants of Health     Financial Resource Strain: Low Risk  (5/23/2024)    Financial Resource Strain     Within the past 12 months, have you or your family members you live with been unable to get utilities (heat, electricity) when it was really needed?: No   Food Insecurity: Low Risk  (5/23/2024)    Food Insecurity     Within the past 12 months, did you worry that your food would run out before you got money to buy more?: No     Within the past 12 months, did the food you bought just not last and you didn t have money to get more?: No   Transportation Needs: Low Risk  (5/23/2024)    Transportation Needs     Within the past 12 months, has lack of transportation kept you from medical appointments, getting your medicines, non-medical meetings or appointments, work, or from getting things that you need?: No   Physical Activity: Insufficiently Active (5/23/2024)    Exercise Vital Sign     Days of Exercise per Week: 3 days     Minutes of Exercise per Session: 30 min   Stress: No Stress Concern Present (5/23/2024)    [employer] - Occupational Stress Questionnaire     Feeling of Stress : Not at all   Social Connections: Unknown (5/23/2024)    Social Connection and Isolation Panel [NHANES]     Frequency of Communication with Friends and Family: Not on file     Frequency of Social  "Gatherings with Friends and Family: More than three times a week     Attends [employer] Services: Not on file     Active Member of Clubs or Organizations: Not on file     Attends Club or Organization Meetings: Not on file     Marital Status: Not on file   Interpersonal Safety: Not on file   Housing Stability: Low Risk  (5/23/2024)    Housing Stability     Do you have housing? : Yes     Are you worried about losing your housing?: No       Family History:  Family History   Problem Relation Age of Onset    Snoring Father     Breast Cancer Mother     Heart Disease Father     Diabetes Father        Review of systems  A complete review of systems reviewed by me is negative with the exeption of what has been mentioned in the history of present illness.    Physical Examination:  Vitals: /62   Pulse 61   Ht 1.778 m (5' 10\")   Wt 97.6 kg (215 lb 3.2 oz)   SpO2 96%   BMI 30.88 kg/m    BMI= Body mass index is 30.88 kg/m .     GENERAL: alert, no distress, and elderly  EYES: Eyes grossly normal to inspection.  No discharge or erythema, or obvious scleral/conjunctival abnormalities.  RESP: No audible wheeze, cough, or visible cyanosis.    SKIN: Visible skin clear. No significant rash, abnormal pigmentation or lesions.  NEURO: Cranial nerves grossly intact.  Mentation and speech appropriate for age.  PSYCH: Appropriate affect, tone, and pace of words          Other tests/labs:   I have reviewed the labs and personally reviewed the imaging below and made my comment in the assessment and plan.        Patient was strongly advised to avoid driving, operating any heavy machinery or other hazardous situations while drowsy or sleepy.  Patient was counseled on the importance of driving while alert, to pull over if drowsy, or nap before getting into the vehicle if sleepy.      Plan is for Kevin Webb to follow up in about 7 month(s).     The above note was dictated using voice recognition software. Although reviewed after " completion, some word and grammatical error may remain . Please contact the author for any clarifications.    Total time spent reviewing medical records, history and physical examination, review of previous testing and interpretation as well as documentation on this date, 09/20/24: 20 minutes    Shelby Barron MD on 09/20/24  [facility]   [address], [address]   [address] [place] [address]. [place] [address]   Appointments: [phone]     CC: Shelby Barron

## 2024-09-20 NOTE — ASSESSMENT & PLAN NOTE
Kevin Webb has Moderate Sleep apnea. He is tolerating PAP well and reports adequate compliance with PAP therapy. Daytime symptoms are improved and reports positive benefits with PAP use.   CB is adequately controlled with Auto CPAP at the current settings per compliance DL. As residual AHI has decreased by 2 but remains 7.2 with recent pressure change.Periodic breathing has decreased from 11.5% to 7.7% with recent change   Recommend changing from auto CPAP 10-15 cm H2O to CPAP 13 cm H2O(90% pressure)  Prescription provided for renewal of PAP supplies.  Recommended him/her to continue using the CPAP regularly during sleep and instructed  to get  the supplies for the PAP replaced regularly.    Patient instructed to remember to bring PAP with him/her if hospitalized and if anticipating procedure that requires sedation/surgery to be sure to discuss with the provider/surgeon that he/she has sleep apnea and uses PAP therapy.

## 2024-09-20 NOTE — ASSESSMENT & PLAN NOTE
Unclear if patient is experiencing 0 RBD 2/2 increase in residual AHI while on CPAP therapy versus true RBD.  Onset of symptoms over the last 2 years.  2 years ago residual AHI was less than 5 and is currently 10.  Recommend better safety  Recommend tighter control of sleep apnea with switching from fixed CPAP to auto CPAP  Wife reports reduction in frequency of dream enactment behavior with improvement in sleep apnea

## 2024-09-26 ENCOUNTER — TRANSFERRED RECORDS (OUTPATIENT)
Dept: HEALTH INFORMATION MANAGEMENT | Facility: CLINIC | Age: 79
End: 2024-09-26
Payer: MEDICARE

## 2024-12-22 ENCOUNTER — HEALTH MAINTENANCE LETTER (OUTPATIENT)
Age: 79
End: 2024-12-22

## 2025-04-13 ASSESSMENT — SLEEP AND FATIGUE QUESTIONNAIRES
HOW LIKELY ARE YOU TO NOD OFF OR FALL ASLEEP WHILE WATCHING TV: SLIGHT CHANCE OF DOZING
HOW LIKELY ARE YOU TO NOD OFF OR FALL ASLEEP WHILE SITTING INACTIVE IN A PUBLIC PLACE: SLIGHT CHANCE OF DOZING
HOW LIKELY ARE YOU TO NOD OFF OR FALL ASLEEP WHILE SITTING AND READING: WOULD NEVER DOZE
HOW LIKELY ARE YOU TO NOD OFF OR FALL ASLEEP WHILE SITTING AND TALKING TO SOMEONE: WOULD NEVER DOZE
HOW LIKELY ARE YOU TO NOD OFF OR FALL ASLEEP WHILE LYING DOWN TO REST IN THE AFTERNOON WHEN CIRCUMSTANCES PERMIT: HIGH CHANCE OF DOZING
HOW LIKELY ARE YOU TO NOD OFF OR FALL ASLEEP WHILE SITTING QUIETLY AFTER LUNCH WITHOUT ALCOHOL: SLIGHT CHANCE OF DOZING
HOW LIKELY ARE YOU TO NOD OFF OR FALL ASLEEP WHEN YOU ARE A PASSENGER IN A CAR FOR AN HOUR WITHOUT A BREAK: SLIGHT CHANCE OF DOZING
HOW LIKELY ARE YOU TO NOD OFF OR FALL ASLEEP IN A CAR, WHILE STOPPED FOR A FEW MINUTES IN TRAFFIC: WOULD NEVER DOZE

## 2025-04-16 ENCOUNTER — OFFICE VISIT (OUTPATIENT)
Dept: SLEEP MEDICINE | Facility: CLINIC | Age: 80
End: 2025-04-16
Payer: MEDICARE

## 2025-04-16 VITALS
HEART RATE: 69 BPM | BODY MASS INDEX: 30.22 KG/M2 | SYSTOLIC BLOOD PRESSURE: 115 MMHG | OXYGEN SATURATION: 93 % | WEIGHT: 211.1 LBS | HEIGHT: 70 IN | DIASTOLIC BLOOD PRESSURE: 61 MMHG

## 2025-04-16 DIAGNOSIS — G47.33 OSA ON CPAP: Primary | ICD-10-CM

## 2025-04-16 PROCEDURE — 3074F SYST BP LT 130 MM HG: CPT | Performed by: STUDENT IN AN ORGANIZED HEALTH CARE EDUCATION/TRAINING PROGRAM

## 2025-04-16 PROCEDURE — 3078F DIAST BP <80 MM HG: CPT | Performed by: STUDENT IN AN ORGANIZED HEALTH CARE EDUCATION/TRAINING PROGRAM

## 2025-04-16 PROCEDURE — 1126F AMNT PAIN NOTED NONE PRSNT: CPT | Performed by: STUDENT IN AN ORGANIZED HEALTH CARE EDUCATION/TRAINING PROGRAM

## 2025-04-16 PROCEDURE — 99213 OFFICE O/P EST LOW 20 MIN: CPT | Performed by: STUDENT IN AN ORGANIZED HEALTH CARE EDUCATION/TRAINING PROGRAM

## 2025-04-16 NOTE — ASSESSMENT & PLAN NOTE
Kevin Webb has Moderate Sleep apnea. He is tolerating PAP well and reports adequate compliance with PAP therapy. Daytime symptoms are improved and reports positive benefits with PAP use.   CB is not adequately controlled with Auto CPAP at the current settings per compliance DL. However, it appears high residual AHI is due to mask leak.   Prescription provided for renewal of PAP supplies.  Recommended him/her to continue using the CPAP regularly during sleep and instructed  to get  the supplies for the PAP replaced regularly.    Patient instructed to remember to bring PAP with him/her if hospitalized and if anticipating procedure that requires sedation/surgery to be sure to discuss with the provider/surgeon that he/she has sleep apnea and uses PAP therapy.

## 2025-04-16 NOTE — PATIENT INSTRUCTIONS
MY INFORMATION ON SLEEP APNEA-  Kevin Webb    DOCTOR : Shelby Barron MD  SLEEP CENTER :      MY CONTACT NUMBER:    [facility]  [phone]  [facility]      For general sleep health questions:   http://sleepeducation.org    For tips about PAP and COVID-19:  [email or web address]://[email or web address].org/patients/patient-resources/resources/covid-19-and-home-pap-therapy.pdf    For general info about COVID-19 including vaccines:  [email or web address]://[email or web address]      Continue PAP therapy every night, for all hours that you are sleeping (including naps.)  As always, try to get at least 8 hours of sleep or more each day, keep a regular sleep schedule, and avoid sleep deprivation. Avoid alcohol.  Reasons that you might need a change to your pressure therapy would be weight gain or loss, waking having inadvertently removed your PAP overnight, having previously felt refreshed by sleep with CPAP use and now waking un-refreshed, and return of daytime sleepiness. Also, the development of new medical problems  (such as heart failure, stroke, medications such as narcotics) can sometimes affect breathing at night and change your PAP therapy needs.  Please bring PAP with you if you are hospitalized.  If anticipating surgery be sure to discuss with your surgeon that you have sleep apnea and use PAP therapy.    Maintain your equipment as recommended which includes routine cleaning and replacement of supplies.      Call DME for any questions regarding supplies or maintenance.    [facility], [facility] [phone]    Do not drive on engage in potentially dangerous activities if feeling sleepy.  Please follow up in sleep clinic again in 5 months.        Tips for your PAP use-    Mask fitting tips  Mask fitting exercise:    To improve your mask seal and your mobility at night, put mask on and secure in place.  Lie down in bed with full pressure and  roll to one side, adjust headgear while in that position to eliminate any leaks. Repeat process rolling to other side.     The mask seal does not have to be perfect:   CPAP machines are designed to make up for small leaks. However, you will not tolerate leaks blowing in your eyes so you will need to adjust.   Any leak should only be near or at the bottom of the mask.  We expect your mask to leak slightly at night.    Do not over-tighten the headgear straps, tighter IS NOT better, we expect minimal leak.    First try re-positioning the mask or headgear before tightening the headgear straps.  Mask leaks are expected due to changing sleeping positions. Try pulling the mask away from your skin allowing the cushion to re-inflate will minimize the leak.  If you struggle for a good fit, try turning the CPAP off and then readjust the mask by pulling it away from your face and then turning back on the CPAP.        Humidifier tips  Humidifiers can be adjusted to increase or decrease the amount of moisture according to your comfort level. You may need to adjust this frequently at first, but then might only change it with seasonal weather changes.     Try INCREASING the humidity if:  You experience a dry, irritated nasal passage or throat.  You have a runny, drippy nose or sneezing fits after using CPAP.  You experience nasal congestion during or after CPAP use.    Try DECREASING the humidity if:  You have excessive condensation or  rain out  in the tubing or mask.  Otherwise keep the tubing warm during the night by running it underneath the blankets or pillow.      Clinic visit after initial PAP set-up   Bring your equipment with you to your 5-8 week follow up clinic visit.  We will be extracting your data from the machine if not available from the cloud based [employer].        Travel  Always take your equipment with you when you travel.  If you fly with your equipment bring it on with you as a carry on.  Medical equipment does  not count as a carry on.    If you travel international the machines take 110-240v.  The only adapter needed is the adapter that will fit into the receptacle (outlet).    You may also want to bring an extension cord as many hotel rooms have limited outlets at the bedside.  Do not travel with water in your humidifier chamber.     Cleaning and Maintenance Guidelines    Equipment Frequency Cleaning Method   Mask First Day    Daily      Weekly Soak mask in hot soapy water for 30 minutes, rinse and air dry.  Wipe nasal cushion with a hot soapy (Ivory, baby shampoo) cloth and rinse.  Baby wipes may also be used.  Do not use anti-bacterial soaps,Carie  liquid soap, rubbing alcohol, bleach or ammonia.  Wash frame in hot soapy water (Ivory, baby shampoo) rinse and let air dry   Headgear Biweekly Wash in hot soapy water, rinse and air dry   Reusable Gray Filter Weekly Wash in hot soapy water, rinse, put in towel squeeze moisture out, let air dry   Disposable White Filter Check Weekly Replace when brown or gray in color; at least every 2 to 3 months   Humidifier Chamber Daily    Weekly Empty distilled water from humidifier and let air dry    Hand wash in hot soapy water, rinse and air dry   Tubing Weekly Wash in hot soapy water, rinse and let air dry   Mask, Tubing and Humidifier Chamber As needed Disinfect: Soak in 1 part distilled white vinegar to 3 parts hot water for 30 minutes, rinse well and air dry  Not the material headgear        MASK AND SUPPLY REORDERING and EQUIPMENT NEEDS through your DME and per your insurance  Reminder: Most insurance companies will allow for a new mask, headgear, tubing, and reusable gray filter every six months.  Disposable white ultra-fine filters are covered monthly.      HOME AND SAFETY INSTRUCTIONS  Do not use frayed or cracked electrical cords, multi plug adaptors, or switched receptacles  Do not immerse electrical equipment into water  Assure that electrical cords do not become a tripping  hazard

## 2025-04-16 NOTE — PROGRESS NOTES
[facility]     Sleep clinic follow up visit note    Date on this visit: 4/16/2025    Primary Physician: Francis Wren     History of present illness:  Kevin Webb is a 79 year old male patient with CB on CPAP, T2DM, HLD, Hx of CVA who presents for CPAP Follow Up (CPAP follow up )     He has moderate sleep apnea with an AHI of 24, managed with CPAP.     Do you use a CPAP Machine at home: (Patient-Rptd) Yes  DME: [facility]  Not in airview: message sent to Daniel to get compliance report  PSG: [facility] 11/7/11 (HE media tab) copy sent to Dr. Mace 7/29/2020 to scan into chart.   AHI: 23.6  DME: [facility]  PSG: [facility] 11/7/11 (media tab)  AHI: 23.6  Overall, on a scale of 0-10 how would you rate your CPAP (0 poor, 10 great): (Patient-Rptd) 10    What type of mask do you use: (Patient-Rptd) Nasal Pillow  Is your mask comfortable: (Patient-Rptd) Yes  If not, why:      Is your mask leaking: (Patient-Rptd) No  If yes, where do you feel it:    How many night per week does the mask leak (0-7):      Do you notice snoring with mask on: (Patient-Rptd) No  Do you notice gasping arousals with mask on: (Patient-Rptd) No  Are you having significant oral or nasal dryness: (Patient-Rptd) No  Is the pressure setting comfortable: (Patient-Rptd) Yes  If not, why:      What is your typical bedtime: (Patient-Rptd) 9 - 9:30 p.m.  How long does it take you to go to sleep on PAP therapy: (Patient-Rptd) 15 to 20 minutes  What time do you typically get out of bed for the day: (Patient-Rptd) 7 - 7:30 a.m.  How many hours on average per night are you using PAP therapy: (Patient-Rptd) All night, I only take it off to use the bathroom  How many hours are you sleeping per night: (Patient-Rptd) 8 to 10  Do you feel well rested in the morning: (Patient-Rptd) Yes    [employer]ironics  CPAP 13 cmH2O 30 day usage data:  100% of days with > 4 hours of use. 0/30 days with no use.   Average use 9 hours 31 minutes  per day.   Average time of night in large leak 3 hours 16 minutes.    AHI 15.7 events per hour.       Assessment and Plan:  Problem List Items Addressed This Visit       CB on CPAP - Primary     Kevin Webb has Moderate Sleep apnea. He is tolerating PAP well and reports adequate compliance with PAP therapy. Daytime symptoms are improved and reports positive benefits with PAP use.   CB is not adequately controlled with Auto CPAP at the current settings per compliance DL. However, it appears high residual AHI is due to mask leak.   Prescription provided for renewal of PAP supplies.  Recommended him/her to continue using the CPAP regularly during sleep and instructed  to get  the supplies for the PAP replaced regularly.    Patient instructed to remember to bring PAP with him/her if hospitalized and if anticipating procedure that requires sedation/surgery to be sure to discuss with the provider/surgeon that he/she has sleep apnea and uses PAP therapy.           Relevant Orders    Comprehensive DME       SCALES:  EPWORTH SLEEPINESS SCALE       4/13/2025     6:39 PM    [place] Sleepiness Scale ( [place]  2440-2440<br>ESS - [place]/English - Final version - 21 Nov 07 - [facility] [place].)   Sitting and reading Would never doze   Watching TV Slight chance of dozing   Sitting, inactive in a public place (e.g. a theatre or a meeting) Slight chance of dozing   As a passenger in a car for an hour without a break Slight chance of dozing   Lying down to rest in the afternoon when circumstances permit High chance of dozing   Sitting and talking to someone Would never doze   Sitting quietly after a lunch without alcohol Slight chance of dozing   In a car, while stopped for a few minutes in traffic Would never doze   Newfield Score (MC) 7   Newfield Score (Sleep) 7        Patient-reported       INSOMNIA SEVERITY INDEX (NOE)        4/13/2025     6:33 PM   Insomnia Severity Index (NOE)   Difficulty falling asleep 0    Difficulty staying asleep 0   Problems waking up too early 0   How SATISFIED/DISSATISFIED are you with your CURRENT sleep pattern? 1   How NOTICEABLE to others do you think your sleep problem is in terms of impairing the quality of your life? 1   How WORRIED/DISTRESSED are you about your current sleep problem? 1   To what extent do you consider your sleep problem to INTERFERE with your daily functioning (e.g. daytime fatigue, mood, ability to function at work/daily chores, concentration, memory, mood, etc.) CURRENTLY? 0   NOE Total Score 3        Patient-reported     Guidelines for Scoring/Interpretation:  Total score categories:  0-7 = No clinically significant insomnia   8-14 = Subthreshold insomnia   15-21 = Clinical insomnia (moderate severity)  22-28 = Clinical insomnia (severe)  Used via courtesy of www.[place].gov with permission from Dany Ca PhD., [facility]      Allergies:    No Known Allergies    Medications:    Current Outpatient Medications   Medication Sig Dispense Refill    aspirin 81 MG EC tablet Take 81 mg by mouth      atorvastatin (LIPITOR) 40 MG tablet Take 1 tablet (40 mg) by mouth at bedtime 90 tablet 3    B-D ULTRA-FINE 33 LANCETS MISC 1 strip      blood glucose (ONETOUCH ULTRA) test strip USE ONCE DAILY E11.9 100 strip PRN    Calcium Carbonate-Vit D-Min (CALCIUM 1200 PO) Take 1,200 mg by mouth daily      clopidogrel (PLAVIX) 75 MG tablet Take 1 tablet (75 mg) by mouth daily 90 tablet 3    Multiple Vitamins-Minerals (AIRBORNE GUMMIES PO)       multivitamin (CENTRUM SILVER) tablet Take 1 tablet by mouth daily         Problem List:  Patient Active Problem List    Diagnosis Date Noted    RBD (REM behavioral disorder) 07/03/2024     Priority: Medium    Anemia, unspecified type 05/17/2022     Priority: Medium    Hx of adenomatous polyp of colon 10/19/2021     Priority: Medium     Last colonoscopy May 2021 with 3 adenomatous polyps and recommendation for 3-year follow-up if  health allows.      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 (H) 11/04/2015     Priority: Medium     Bilateral Cerebellar & right thalamic        CB on CPAP 05/28/2015     Priority: Medium    Essential Hypercholesterolemia      Priority: Medium     Created by Conversion            Past Medical/Surgical History:  Past Medical History:   Diagnosis Date    Basilar artery thrombosis     Cancer (H)     SKIN     Dyslipidemia     Hyperlipidemia     CB (obstructive sleep apnea)     Sleep apnea     Stroke (H)     HERE WITH tia SYMPTOMS    Vertebral artery stenosis      Past Surgical History:   Procedure Laterality Date     KNEE SCOPE, DIAGNOSTIC      Description: Arthroscopy Knee Left;  Recorded: 02/18/2009;     KNEE SCOPE, DIAGNOSTIC      Description: Arthroscopy Knee Left;  Proc Date: 08/02/2007;    IR CEREBRAL ANGIOGRAM  11/5/2015    IR CEREBRAL ANGIOGRAM  6/20/2016    IR VENOUS STENT  12/28/2015    JOINT REPLACEMENT      KNEE SURGERY Bilateral     OTHER SURGICAL HISTORY Right 10/14/2015    partial [facility]isce[facility] knee[facility]. Levon[facility]  11/4/2015         THROMBECTOMY      Z TOTAL KNEE ARTHROPLASTY Left     Description: Total Knee Arthroplasty;  Proc Date: 02/24/2009;  Comments: Dr. Richi Dos Santos at [facility]       Social History:  Social History     Socioeconomic History    Marital status:      Spouse name: Not on file    Number of children: Not on file    Years of education: Not on file    Highest education level: Not on file   Occupational History    Not on file   Tobacco Use    Smoking status: Former     Passive exposure: Past     Smokeless tobacco: Never   Vaping Use    Vaping status: Never Used   Substance and Sexual Activity    Alcohol use: Not on file    Drug use: Not on file    Sexual activity: Not on file   Other Topics Concern    Not on file   Social History Narrative    Not on file     Social Drivers of Health     Financial Resource Strain: Low Risk  (5/23/2024)    Financial Resource Strain     Within the past 12 months, have you or your family members you live with been unable to get utilities (heat, electricity) when it was really needed?: No   Food Insecurity: Low Risk  (5/23/2024)    Food Insecurity     Within the past 12 months, did you worry that your food would run out before you got money to buy more?: No     Within the past 12 months, did the food you bought just not last and you didn t have money to get more?: No   Transportation Needs: Low Risk  (5/23/2024)    Transportation Needs     Within the past 12 months, has lack of transportation kept you from medical appointments, getting your medicines, non-medical meetings or appointments, work, or from getting things that you need?: No   Physical Activity: Insufficiently Active (5/23/2024)    Exercise Vital Sign     Days of Exercise per Week: 3 days     Minutes of Exercise per Session: 30 min   Stress: No Stress Concern Present (5/23/2024)    [place] [employer] - Occupational Stress Questionnaire     Feeling of Stress : Not at all   Social Connections: Unknown (5/23/2024)    Social Connection and Isolation Panel [NHANES]     Frequency of Communication with Friends and Family: Not on file     Frequency of Social Gatherings with Friends and Family: More than three times a week     Attends [employer] Services: Not on file     Active Member of Clubs or Organizations: Not on file     Attends Club or Organization Meetings: Not on file     Marital Status: Not on file   Interpersonal Safety: Not on file   Housing Stability: Low Risk  (5/23/2024)    Housing Stability  "    Do you have housing? : Yes     Are you worried about losing your housing?: No       Family History:  Family History   Problem Relation Age of Onset    Snoring Father     Breast Cancer Mother     Heart Disease Father     Diabetes Father        Review of systems  A complete review of systems reviewed by me is negative with the exeption of what has been mentioned in the history of present illness.    Physical Examination:  Vitals: /61   Pulse 69   Ht 1.778 m (5' 10\")   Wt 95.8 kg (211 lb 1.6 oz)   SpO2 93%   BMI 30.29 kg/m    BMI= Body mass index is 30.29 kg/m .     GENERAL: alert, no distress, and elderly  EYES: Eyes grossly normal to inspection.  No discharge or erythema, or obvious scleral/conjunctival abnormalities.  RESP: No audible wheeze, cough, or visible cyanosis.    SKIN: Visible skin clear. No significant rash, abnormal pigmentation or lesions.  NEURO: Cranial nerves grossly intact.  Mentation and speech appropriate for age.  PSYCH: Appropriate affect, tone, and pace of words          Other tests/labs:   I have reviewed the labs and personally reviewed the imaging below and made my comment in the assessment and plan.        Patient was strongly advised to avoid driving, operating any heavy machinery or other hazardous situations while drowsy or sleepy.  Patient was counseled on the importance of driving while alert, to pull over if drowsy, or nap before getting into the vehicle if sleepy.      Plan is for Kevin Webb to follow up in about 5 month(s).     The above note was dictated using voice recognition software. Although reviewed after completion, some word and grammatical error may remain . Please contact the author for any clarifications.    Total time spent reviewing medical records, history and physical examination, review of previous testing and interpretation as well as documentation on this date, 04/16/25: 23 minutes    Shelby Barron MD on 04/16/25  [facility] " [facility] - [facility] Professional [facility]   Floor 1, [address]   [address] [place] [address]   Appointments: [phone]     CC: Shelby Barron

## 2025-05-22 ENCOUNTER — TRANSFERRED RECORDS (OUTPATIENT)
Dept: MULTI SPECIALTY CLINIC | Facility: CLINIC | Age: 80
End: 2025-05-22
Payer: MEDICARE

## 2025-05-22 LAB — RETINOPATHY: NORMAL

## 2025-05-31 SDOH — HEALTH STABILITY: PHYSICAL HEALTH: ON AVERAGE, HOW MANY DAYS PER WEEK DO YOU ENGAGE IN MODERATE TO STRENUOUS EXERCISE (LIKE A BRISK WALK)?: 3 DAYS

## 2025-05-31 SDOH — HEALTH STABILITY: PHYSICAL HEALTH: ON AVERAGE, HOW MANY MINUTES DO YOU ENGAGE IN EXERCISE AT THIS LEVEL?: 150+ MIN

## 2025-05-31 ASSESSMENT — SOCIAL DETERMINANTS OF HEALTH (SDOH): HOW OFTEN DO YOU GET TOGETHER WITH FRIENDS OR RELATIVES?: MORE THAN THREE TIMES A WEEK

## 2025-06-03 ENCOUNTER — PATIENT OUTREACH (OUTPATIENT)
Dept: FAMILY MEDICINE | Facility: CLINIC | Age: 80
End: 2025-06-03
Payer: MEDICARE

## 2025-06-03 NOTE — TELEPHONE ENCOUNTER
Patient Quality Outreach    Patient is due for the following:   Physical Annual Wellness Visit    Action(s) Taken:   Schedule a Annual Wellness Visit    Type of outreach:    Chart review performed, no outreach needed.    Questions for provider review:    None         Laurie Knowles MA  Chart routed to None.

## 2025-06-05 ENCOUNTER — OFFICE VISIT (OUTPATIENT)
Dept: FAMILY MEDICINE | Facility: CLINIC | Age: 80
End: 2025-06-05
Attending: NURSE PRACTITIONER
Payer: MEDICARE

## 2025-06-05 VITALS
HEIGHT: 71 IN | HEART RATE: 70 BPM | WEIGHT: 218 LBS | RESPIRATION RATE: 18 BRPM | TEMPERATURE: 97.8 F | BODY MASS INDEX: 30.52 KG/M2 | OXYGEN SATURATION: 96 % | DIASTOLIC BLOOD PRESSURE: 70 MMHG | SYSTOLIC BLOOD PRESSURE: 110 MMHG

## 2025-06-05 DIAGNOSIS — Z13.6 SCREENING FOR CARDIOVASCULAR CONDITION: ICD-10-CM

## 2025-06-05 DIAGNOSIS — Z00.00 ENCOUNTER FOR MEDICARE ANNUAL WELLNESS EXAM: Primary | ICD-10-CM

## 2025-06-05 DIAGNOSIS — R41.89 ALTERATION IN COGNITION: ICD-10-CM

## 2025-06-05 DIAGNOSIS — I63.431 CEREBRAL INFARCTION DUE TO EMBOLISM OF RIGHT POSTERIOR CEREBRAL ARTERY (H): ICD-10-CM

## 2025-06-05 DIAGNOSIS — R53.83 OTHER FATIGUE: ICD-10-CM

## 2025-06-05 DIAGNOSIS — E11.9 TYPE 2 DIABETES MELLITUS WITHOUT COMPLICATION, WITHOUT LONG-TERM CURRENT USE OF INSULIN (H): ICD-10-CM

## 2025-06-05 DIAGNOSIS — E78.00 PURE HYPERCHOLESTEROLEMIA: ICD-10-CM

## 2025-06-05 LAB
ALBUMIN SERPL BCG-MCNC: 4.2 G/DL (ref 3.5–5.2)
ALBUMIN UR-MCNC: NEGATIVE MG/DL
ALP SERPL-CCNC: 75 U/L (ref 40–150)
ALT SERPL W P-5'-P-CCNC: 37 U/L (ref 0–70)
ANION GAP SERPL CALCULATED.3IONS-SCNC: 15 MMOL/L (ref 7–15)
APPEARANCE UR: CLEAR
AST SERPL W P-5'-P-CCNC: 32 U/L (ref 0–45)
BACTERIA #/AREA URNS HPF: ABNORMAL /HPF
BILIRUB SERPL-MCNC: 0.4 MG/DL
BILIRUB UR QL STRIP: NEGATIVE
BUN SERPL-MCNC: 23.6 MG/DL (ref 8–23)
CALCIUM SERPL-MCNC: 8.9 MG/DL (ref 8.8–10.4)
CHLORIDE SERPL-SCNC: 107 MMOL/L (ref 98–107)
CHOLEST SERPL-MCNC: 92 MG/DL
COLOR UR AUTO: YELLOW
CREAT SERPL-MCNC: 1.04 MG/DL (ref 0.67–1.17)
CREAT UR-MCNC: 220 MG/DL
EGFRCR SERPLBLD CKD-EPI 2021: 73 ML/MIN/1.73M2
ERYTHROCYTE [DISTWIDTH] IN BLOOD BY AUTOMATED COUNT: 12.6 % (ref 10–15)
EST. AVERAGE GLUCOSE BLD GHB EST-MCNC: 171 MG/DL
FASTING STATUS PATIENT QL REPORTED: ABNORMAL
FASTING STATUS PATIENT QL REPORTED: ABNORMAL
FOLATE SERPL-MCNC: 17.2 NG/ML (ref 4.6–34.8)
GLUCOSE SERPL-MCNC: 179 MG/DL (ref 70–99)
GLUCOSE UR STRIP-MCNC: NEGATIVE MG/DL
HBA1C MFR BLD: 7.6 % (ref 0–5.6)
HCO3 SERPL-SCNC: 19 MMOL/L (ref 22–29)
HCT VFR BLD AUTO: 41.5 % (ref 40–53)
HDLC SERPL-MCNC: 33 MG/DL
HGB BLD-MCNC: 13.3 G/DL (ref 13.3–17.7)
HGB UR QL STRIP: NEGATIVE
HYALINE CASTS #/AREA URNS LPF: ABNORMAL /LPF
KETONES UR STRIP-MCNC: ABNORMAL MG/DL
LDLC SERPL CALC-MCNC: 35 MG/DL
LEUKOCYTE ESTERASE UR QL STRIP: NEGATIVE
MCH RBC QN AUTO: 27.7 PG (ref 26.5–33)
MCHC RBC AUTO-ENTMCNC: 32 G/DL (ref 31.5–36.5)
MCV RBC AUTO: 86 FL (ref 78–100)
MICROALBUMIN UR-MCNC: 15.8 MG/L
MICROALBUMIN/CREAT UR: 7.18 MG/G CR (ref 0–17)
MUCOUS THREADS #/AREA URNS LPF: PRESENT /LPF
NITRATE UR QL: NEGATIVE
NONHDLC SERPL-MCNC: 59 MG/DL
PH UR STRIP: 6 [PH] (ref 5–8)
PLATELET # BLD AUTO: 213 10E3/UL (ref 150–450)
POTASSIUM SERPL-SCNC: 4.2 MMOL/L (ref 3.4–5.3)
PROT SERPL-MCNC: 6.9 G/DL (ref 6.4–8.3)
RBC # BLD AUTO: 4.81 10E6/UL (ref 4.4–5.9)
RBC #/AREA URNS AUTO: ABNORMAL /HPF
SODIUM SERPL-SCNC: 141 MMOL/L (ref 135–145)
SP GR UR STRIP: 1.02 (ref 1–1.03)
SQUAMOUS #/AREA URNS AUTO: ABNORMAL /LPF
TRIGL SERPL-MCNC: 121 MG/DL
TSH SERPL DL<=0.005 MIU/L-ACNC: 2.97 UIU/ML (ref 0.3–4.2)
UROBILINOGEN UR STRIP-ACNC: 0.2 E.U./DL
VIT B12 SERPL-MCNC: 522 PG/ML (ref 232–1245)
WBC # BLD AUTO: 10 10E3/UL (ref 4–11)
WBC #/AREA URNS AUTO: ABNORMAL /HPF

## 2025-06-05 RX ORDER — CLOPIDOGREL BISULFATE 75 MG/1
75 TABLET ORAL DAILY
Qty: 90 TABLET | Refills: 3 | Status: SHIPPED | OUTPATIENT
Start: 2025-06-05

## 2025-06-05 RX ORDER — ATORVASTATIN CALCIUM 40 MG/1
40 TABLET, FILM COATED ORAL AT BEDTIME
Qty: 90 TABLET | Refills: 3 | Status: SHIPPED | OUTPATIENT
Start: 2025-06-05

## 2025-06-05 ASSESSMENT — PAIN SCALES - GENERAL: PAINLEVEL_OUTOF10: NO PAIN (0)

## 2025-06-05 NOTE — PROGRESS NOTES
Preventive Care Visit  [facility]  Francis Wren NP, Family Medicine  Jun 5, 2025      Assessment & Plan       ICD-10-CM    1. Encounter for Medicare annual wellness exam  Z00.00       2. Pure hypercholesterolemia  E78.00 atorvastatin (LIPITOR) 40 MG tablet     Comprehensive metabolic panel (BMP + Alb, Alk Phos, ALT, AST, Total. Bili, TP)     Comprehensive metabolic panel (BMP + Alb, Alk Phos, ALT, AST, Total. Bili, TP)      3. 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 Type 2 diabetes mellitus without complication, without long-term current use of insulin (H)  E11.9 HEMOGLOBIN A1C     Lipid panel reflex to direct LDL Fasting     Albumin Random Urine Quantitative with Creat Ratio     CBC with platelets     HEMOGLOBIN A1C     Lipid panel reflex to direct LDL Fasting     Albumin Random Urine Quantitative with Creat Ratio     CBC with platelets      5. Screening for cardiovascular condition  Z13.6       6. Alteration in cognition  R41.89 Folate     UA with Microscopic     Vitamin B12     TSH with free T4 reflex     Folate     UA with Microscopic     Vitamin B12     TSH with free T4 reflex     Urine Microscopic Exam      7. Other fatigue  R53.83 TSH with free T4 reflex     TSH with free T4 reflex        Lab work to rule out reversible causes of mild cognitive impairment.  If normal, likely age/CVA history related.  Overall doing well.  Update diabetic labs.  Reviewed healthy lifestyle behaviors.  Disability parking placard paperwork filled out and given to patient today.  If cognition worsens over the summer/fall let me know      The longitudinal plan of care for the diagnosis(es)/condition(s) as documented were addressed during this visit. Due to the added complexity in care, I will continue to support Kevin in the subsequent management and with ongoing continuity of care.      BMI  Estimated body mass  "index is 30.4 kg/m  as calculated from the following:    Height as of this encounter: 1.803 m (5' 11\").    Weight as of this encounter: 98.9 kg (218 lb).   Weight management plan: Discussed healthy diet and exercise guidelines    Counseling  Appropriate preventive services were addressed with this patient via screening, questionnaire, or discussion as appropriate for fall prevention, nutrition, physical activity, Tobacco-use cessation, social engagement, weight loss and cognition.  Checklist reviewing preventive services available has been given to the patient.  Reviewed patient's diet, addressing concerns and/or questions.   He is at risk for lack of exercise and has been provided with information to increase physical activity for the benefit of his well-being.   Addressed any concerns about safety while driving.  Information on urinary incontinence and treatment options given to patient.       The longitudinal plan of care for the diagnosis(es)/condition(s) as documented were addressed during this visit. Due to the added complexity in care, I will continue to support Kevin in the subsequent management and with ongoing continuity of care.      Elise Brown is a 79 year old, presenting for the following:  Annual Visit (Fasting)        6/5/2025     7:10 AM   Additional Questions   Roomed by Laurie Knowles CMA        Via the Health Maintenance questionnaire, the patient has reported the following services have been completed -Eye Exam: [facility] 2025-05-22, this information has been sent to the abstraction team.    HPI    Lab Results   Component Value Date    A1C 6.6 05/28/2024    A1C 6.4 09/26/2023    A1C 6.6 04/11/2023    A1C 6.5 05/16/2022    A1C 6.3 10/18/2021     Gets to the [facility] 3 days a week.  Does walking the track and the stationary bike.      Does not drive during the night. If it's cloudy or poor weather, he will have his wife drive.     Short-term memory has been a little more difficult.  " Confirmed with wife.  No sundowning.  No hallucinations.  No impacts to ADL.    No neuropathy symptoms.  Still cutting nails by himself.    Advance Care Planning  Previously reviewed        5/31/2025   General Health   How would you rate your overall physical health? Good   Feel stress (tense, anxious, or unable to sleep) Only a little   (!) STRESS CONCERN      5/31/2025   Nutrition   Diet: Low salt    Low fat/cholesterol    Diabetic       Multiple values from one day are sorted in reverse-chronological order         5/31/2025   Exercise   Days per week of moderate/strenous exercise 3 days   Average minutes spent exercising at this level 150+ min         5/31/2025   Social Factors   Frequency of gathering with friends or relatives More than three times a week   Worry food won't last until get money to buy more No   Food not last or not have enough money for food? No   Do you have housing? (Housing is defined as stable permanent housing and does not include staying outside in a car, in a tent, in an abandoned building, in an overnight shelter, or couch-surfing.) No   Are you worried about losing your housing? No   Lack of transportation? No   Unable to get utilities (heat,electricity)? No   Want help with housing or utility concern? No   (!) HOUSING CONCERN PRESENT      6/5/2025   Fall Risk   Gait Speed Test (Document in seconds) 4   Gait Speed Test Interpretation Less than or equal to 5.00 seconds - PASS          5/31/2025   Activities of Daily Living- Home Safety   Needs help with the following daily activites None of the above   Safety concerns in the home None of the above         5/31/2025   Dental   Dentist two times every year? Yes         5/31/2025   Hearing Screening   Hearing concerns? None of the above         5/31/2025   Driving Risk Screening   Patient/family members have concerns about driving (!) YES          5/31/2025   General Alertness/Fatigue Screening   Have you been more tired than usual lately?  No         5/31/2025   Urinary Incontinence Screening   Bothered by leaking urine in past 6 months Yes         Today's PHQ-2 Score:       6/5/2025     6:56 AM   PHQ-2 ( 1999 [employer])   Q1: Little interest or pleasure in doing things 0   Q2: Feeling down, depressed or hopeless 0   PHQ-2 Score 0    Q1: Little interest or pleasure in doing things Not at all   Q2: Feeling down, depressed or hopeless Not at all   PHQ-2 Score 0       Patient-reported           5/31/2025   Substance Use   Alcohol more than 3/day or more than 7/wk Not Applicable   Do you have a current opioid prescription? No   How severe/bad is pain from 1 to 10? 0/10 (No Pain)   Do you use any other substances recreationally? (!) PRESCRIPTION DRUGS     Social History     Tobacco Use    Smoking status: Former     Passive exposure: Past    Smokeless tobacco: Never   Vaping Use    Vaping status: Never Used       Reviewed and updated as needed this visit by Provider                    Past Medical History:   Diagnosis Date    Basilar artery thrombosis     Cancer (H)     SKIN     Diabetes (H)     Dyslipidemia     Hyperlipidemia     CB (obstructive sleep apnea)     Sleep apnea     Stroke (H)     HERE WITH tia SYMPTOMS    Vertebral artery stenosis      Past Surgical History:   Procedure Laterality Date    COLONOSCOPY  April 2021    EYE SURGERY  Sept. 2022     KNEE SCOPE, DIAGNOSTIC      Description: Arthroscopy Knee Left;  Recorded: 02/18/2009;     KNEE SCOPE, DIAGNOSTIC      Description: Arthroscopy Knee Left;  Proc Date: 08/02/2007;    HERNIA REPAIR      IR CEREBRAL ANGIOGRAM  11/05/2015    IR CEREBRAL ANGIOGRAM  06/20/2016    IR VENOUS STENT  12/28/2015    JOINT REPLACEMENT      KNEE SURGERY Bilateral     OTHER SURGICAL HISTORY Right 10/14/2015    [facility]         THROMBECTOMY      [facility] TOTAL KNEE ARTHROPLASTY Left     Description: Total Knee Arthroplasty;  Proc Date:  "02/24/2009;  Comments: [facility] Richi D[facility] at [facility]     Current providers sharing in care for this patient include:  Patient Care Team:  Francis Wren NP as PCP - General  Francis Wren NP as Assigned PCP    The following health maintenance items are reviewed in Epic and correct as of today:  Health Maintenance   Topic Date Due    ANNUAL REVIEW OF HM ORDERS  10/18/2022    A1C  11/28/2024    COVID-19 VACCINE (12 - 2024-25 season) 05/22/2025    BMP  05/28/2025    LIPID  05/28/2025    MICROALBUMIN  05/28/2025    DIABETIC FOOT EXAM  05/28/2025    MEDICARE ANNUAL WELLNESS VISIT  05/28/2025    EYE EXAM  05/22/2026    FALL RISK ASSESSMENT  06/05/2026    ADVANCE CARE PLANNING  05/28/2029    COLORECTAL CANCER SCREENING  05/07/2031    DTAP/TDAP/TD VACCINE (3 - Td or Tdap) 03/18/2035    PHQ-2 (once per calendar year)  Completed    INFLUENZA VACCINE  Completed    PNEUMOCOCCAL VACCINE 50+ YEARS  Completed    ZOSTER VACCINE  Completed    RSV VACCINE  Completed    HPV VACCINE  Aged Out    MENINGITIS VACCINE  Aged Out         Review of Systems  Constitutional, HEENT, cardiovascular, pulmonary, gi and gu systems are negative, except as otherwise noted.     Objective    Exam  /70 (BP Location: Left arm, Patient Position: Sitting, Cuff Size: Adult Regular)   Pulse 70   Temp 97.8  F (36.6  C) (Oral)   Resp 18   Ht 1.803 m (5' 11\")   Wt 98.9 kg (218 lb)   SpO2 96%   BMI 30.40 kg/m     Estimated body mass index is 30.4 kg/m  as calculated from the following:    Height as of this encounter: 1.803 m (5' 11\").    Weight as of this encounter: 98.9 kg (218 lb).    Physical Exam  GENERAL: alert and no distress  EYES: Eyes grossly normal to inspection, PERRL and conjunctivae and sclerae normal  HENT: ear canals and TM's normal, nose and mouth without ulcers or lesions  NECK: no adenopathy, no asymmetry, masses, or scars  RESP: lungs clear to auscultation - no rales, rhonchi or wheezes  CV: regular rate and rhythm, " normal S1 S2, no S3 or S4, no murmur, click or rub, no peripheral edema  ABDOMEN: soft, nontender, no hepatosplenomegaly, no masses and bowel sounds normal  MS: no gross musculoskeletal defects noted, no edema  SKIN: no suspicious lesions or rashes  NEURO: Normal strength and tone, mentation intact and speech normal  PSYCH: mentation appears normal, affect normal/bright  Gait and balance assessed per Gait Speed Test.  Result as above.        6/5/2025   Mini Cog   Clock Draw Score 0 Abnormal   3 Item Recall 3 objects recalled   Mini Cog Total Score 3       Signed Electronically by: Francis Wren NP

## 2025-06-05 NOTE — PATIENT INSTRUCTIONS
Parking permit filled out for you today. You'll need to bring that to [facility] to get an updated placard    Patient Education   Preventive Care Advice   This is general advice given by our system to help you stay healthy. However, your care team may have specific advice just for you. Please talk to your care team about your preventive care needs.  Nutrition  Eat 5 or more servings of fruits and vegetables each day.  Try wheat bread, brown rice and whole grain pasta (instead of white bread, rice, and pasta).  Get enough calcium and vitamin D. Check the label on foods and aim for 100% of the RDA (recommended daily allowance).  Lifestyle  Exercise at least 150 minutes each week  (30 minutes a day, 5 days a week).  Do muscle strengthening activities 2 days a week. These help control your weight and prevent disease.  No smoking.  Wear sunscreen to prevent skin cancer.  Have a dental exam and cleaning every 6 months.  Yearly exams  See your health care team every year to talk about:  Any changes in your health.  Any medicines your care team has prescribed.  Preventive care, family planning, and ways to prevent chronic diseases.  Shots (vaccines)   HPV shots (up to age 26), if you've never had them before.  Hepatitis B shots (up to age 59), if you've never had them before.  COVID-19 shot: Get this shot when it's due.  Flu shot: Get a flu shot every year.  Tetanus shot: Get a tetanus shot every 10 years.  Pneumococcal, hepatitis A, and RSV shots: Ask your care team if you need these based on your risk.  Shingles shot (for age 50 and up)  General health tests  Diabetes screening:  Starting at age 35, Get screened for diabetes at least every 3 years.  If you are younger than age 35, ask your care team if you should be screened for diabetes.  Cholesterol test: At age 39, start having a cholesterol test every 5 years, or more often if advised.  Bone density scan (DEXA): At age 50, ask your care team if you should have this scan for  osteoporosis (brittle bones).  Hepatitis C: Get tested at least once in your life.  STIs (sexually transmitted infections)  Before age 24: Ask your care team if you should be screened for STIs.  After age 24: Get screened for STIs if you're at risk. You are at risk for STIs (including HIV) if:  You are sexually active with more than one person.  You don't use condoms every time.  You or a partner was diagnosed with a sexually transmitted infection.  If you are at risk for HIV, ask about PrEP medicine to prevent HIV.  Get tested for HIV at least once in your life, whether you are at risk for HIV or not.  Cancer screening tests  Cervical cancer screening: If you have a cervix, begin getting regular cervical cancer screening tests starting at age 21.  Breast cancer scan (mammogram): If you've ever had breasts, begin having regular mammograms starting at age 40. This is a scan to check for breast cancer.  Colon cancer screening: It is important to start screening for colon cancer at age 45.  Have a colonoscopy test every 10 years (or more often if you're at risk) Or, ask your provider about stool tests like a FIT test every year or Cologuard test every 3 years.  To learn more about your testing options, visit:   .  For help making a decision, visit:   https://bit.ly/sr59474.  Prostate cancer screening test: If you have a prostate, ask your care team if a prostate cancer screening test (PSA) at age 55 is right for you.  Lung cancer screening: If you are a current or former smoker ages 50 to 80, ask your care team if ongoing lung cancer screenings are right for you.  For informational purposes only. Not to replace the advice of your health care provider. Copyright   2023 [employer]. All rights reserved. Clinically reviewed by the [facility]. [email or web address].es [id number] - REV 01/24.  Preventing Falls: Care Instructions  Injuries and health problems such as trouble walking or poor eyesight can  increase your risk of falling. So can some medicines. But there are things you can do to help prevent falls. You can exercise to get stronger. You can also arrange your home to make it safer.    Talk to your doctor about the medicines you take. Ask if any of them increase the risk of falls and whether they can be changed or stopped.   Try to exercise regularly. It can help improve your strength and balance. This can help lower your risk of falling.         Practice fall safety and prevention.   Wear low-heeled shoes that fit well and give your feet good support. Talk to your doctor if you have foot problems that make this hard.  Carry a cellphone or wear a medical alert device that you can use to call for help.  Use stepladders instead of chairs to reach high objects. Don't climb if you're at risk for falls. Ask for help, if needed.  Wear the correct eyeglasses, if you need them.        Make your home safer.   Remove rugs, cords, clutter, and furniture from walkways.  Keep your house well lit. Use night-lights in hallways and bathrooms.  Install and use sturdy handrails on stairways.  Wear nonskid footwear, even inside. Don't walk barefoot or in socks without shoes.        Be safe outside.   Use handrails, curb cuts, and ramps whenever possible.  Keep your hands free by using a shoulder bag or backpack.  Try to walk in well-lit areas. Watch out for uneven ground, changes in pavement, and debris.  Be careful in the winter. Walk on the grass or gravel when sidewalks are slippery. Use de-icer on steps and walkways. Add non-slip devices to shoes.    Put grab bars and nonskid mats in your shower or tub and near the toilet. Try to use a shower chair or bath bench when bathing.   Get into a tub or shower by putting in your weaker leg first. Get out with your strong side first. Have a phone or medical alert device in the bathroom with you.   Where can you learn more?  Go to https://www.healthwise.net/patiented  Enter G957  "in the search box to learn more about \"Preventing Falls: Care Instructions.\"  Current as of: July 31, 2024  Content Version: 14.4    2851-1713 [employer].   Care instructions adapted under license by your healthcare professional. If you have questions about a medical condition or this instruction, always ask your healthcare professional. [employer] disclaims any warranty or liability for your use of this information.    Bladder Training: Care Instructions  Your Care Instructions     Bladder training is used to treat urge incontinence and stress incontinence. Urge incontinence means that the need to urinate comes on so fast that you can't get to a toilet in time. Stress incontinence means that you leak urine because of pressure on your bladder. For example, it may happen when you laugh, cough, or lift something heavy.  Bladder training can increase how long you can wait before you have to urinate. It can also help your bladder hold more urine. And it can give you better control over the urge to urinate.  It is important to remember that bladder training takes a few weeks to a few months to make a difference. You may not see results right away, but don't give up.  Follow-up care is a key part of your treatment and safety. Be sure to make and go to all appointments, and call your doctor if you are having problems. It's also a good idea to know your test results and keep a list of the medicines you take.  How can you care for yourself at home?  Work with your doctor to come up with a bladder training program that is right for you. You may use one or more of the following methods.  Delayed urination  In the beginning, try to keep from urinating for 5 minutes after you first feel the need to go.  While you wait, take deep, slow breaths to relax. Kegel exercises can also help you delay the need to go to the bathroom.  After some practice, when you can easily wait 5 minutes to urinate, try to wait 10 " "minutes before you urinate.  Slowly increase the waiting period until you are able to control when you have to urinate.  Scheduled urination  Empty your bladder when you first wake up in the morning.  Schedule times throughout the day when you will urinate.  Start by going to the bathroom every hour, even if you don't need to go.  Slowly increase the time between trips to the bathroom.  When you have found a schedule that works well for you, keep doing it.  If you wake up during the night and have to urinate, do it. Apply your schedule to waking hours only.  Kegel exercises  These tighten and strengthen pelvic muscles, which can help you control the flow of urine. (If doing these exercises causes pain, stop doing them and talk with your doctor.) To do Kegel exercises:  Squeeze your muscles as if you were trying not to pass gas. Or squeeze your muscles as if you were stopping the flow of urine. Your belly, legs, and buttocks shouldn't move.  Hold the squeeze for 3 seconds, then relax for 5 to 10 seconds.  Start with 3 seconds, then add 1 second each week until you are able to squeeze for 10 seconds.  Repeat the exercise 10 times a session. Do 3 to 8 sessions a day.  When should you call for help?  Watch closely for changes in your health, and be sure to contact your doctor if:    Your incontinence is getting worse.     You do not get better as expected.   Where can you learn more?  Go to https://www.[employer].net/patiented  Enter V684 in the search box to learn more about \"Bladder Training: Care Instructions.\"  Current as of: April 30, 2024  Content Version: 14.4    6157-8535 [employer].   Care instructions adapted under license by your healthcare professional. If you have questions about a medical condition or this instruction, always ask your healthcare professional. [employer] disclaims any warranty or liability for your use of this information.    Substance Use Disorder: Care " Instructions  Overview     You can improve your life and health by stopping your use of alcohol or drugs. When you don't drink or use drugs, you may feel and sleep better. You may get along better with your family, friends, and coworkers. There are medicines and programs that can help with substance use disorder.  How can you care for yourself at home?  Here are some ways to help you stay sober and prevent relapse.  If you have been given medicine to help keep you sober or reduce your cravings, be sure to take it exactly as prescribed.  Talk to your doctor about programs that can help you stop using drugs or drinking alcohol.  Do not keep alcohol or drugs in your home.  Plan ahead. Think about what you'll say if other people ask you to drink or use drugs. Try not to spend time with people who drink or use drugs.  Use the time and money spent on drinking or drugs to do something that's important to you.  Preventing a relapse  Have a plan to deal with relapse. Learn to recognize changes in your thinking that lead you to drink or use drugs. Get help before you start to drink or use drugs again.  Try to stay away from situations, friends, or places that may lead you to drink or use drugs.  If you feel the need to drink alcohol or use drugs again, seek help right away. Call a trusted friend or family member. Some people get support from organizations such as Narcotics Anonymous or [employer] or from treatment facilities.  If you relapse, get help as soon as you can. Some people make a plan with another person that outlines what they want that person to do for them if they relapse. The plan usually includes how to handle the relapse and who to notify in case of relapse.  Don't give up. Remember that a relapse doesn't mean that you have failed. Use the experience to learn the triggers that lead you to drink or use drugs. Then quit again. Recovery is a lifelong process. Many people have several relapses before they are  "able to quit for good.  Follow-up care is a key part of your treatment and safety. Be sure to make and go to all appointments, and call your doctor if you are having problems. It's also a good idea to know your test results and keep a list of the medicines you take.  When should you call for help?   Call 911  anytime you think you may need emergency care. For example, call if you or someone else:    Has overdosed or has withdrawal signs. Be sure to tell the emergency workers that you are or someone else is using or trying to quit using drugs. Overdose or withdrawal signs may include:  Losing consciousness.  Seizure.  Seeing or hearing things that aren't there (hallucinations).     Is thinking or talking about suicide or harming others.   Where to get help 24 hours a day, 7 days a week   If you or someone you know talks about suicide, self-harm, a mental health crisis, a substance use crisis, or any other kind of emotional distress, get help right away. You can:    Call the Suicide and Crisis Lifeline at 012.     Call 5-920-868-QXCE (1-720.126.4078).     Text HOME to 043763 to access the Crisis Text Line.   Consider saving these numbers in your phone.  Go to [email or web address] for more information or to chat online.  Call your doctor now or seek immediate medical care if:    You are having withdrawal symptoms. These may include nausea or vomiting, sweating, shakiness, and anxiety.   Watch closely for changes in your health, and be sure to contact your doctor if:    You have a relapse.     You need more help or support to stop.   Where can you learn more?  Go to https://www.[employer].net/patiented  Enter H573 in the search box to learn more about \"Substance Use Disorder: Care Instructions.\"  Current as of: August 20, 2024  Content Version: 14.4    5854-1776 [employer].   Care instructions adapted under license by your healthcare professional. If you have questions about a medical condition or this " instruction, always ask your healthcare professional. [employer], [facility] disclaims any warranty or liability for your use of this information.

## 2025-06-06 ENCOUNTER — RESULTS FOLLOW-UP (OUTPATIENT)
Dept: FAMILY MEDICINE | Facility: CLINIC | Age: 80
End: 2025-06-06